# Patient Record
Sex: FEMALE | Race: WHITE | Employment: OTHER
[De-identification: names, ages, dates, MRNs, and addresses within clinical notes are randomized per-mention and may not be internally consistent; named-entity substitution may affect disease eponyms.]

---

## 2017-01-11 ENCOUNTER — PROCEDURE VISIT (OUTPATIENT)
Dept: PODIATRY | Facility: CLINIC | Age: 82
End: 2017-01-11

## 2017-01-11 VITALS
WEIGHT: 228 LBS | SYSTOLIC BLOOD PRESSURE: 134 MMHG | BODY MASS INDEX: 40.4 KG/M2 | HEART RATE: 70 BPM | DIASTOLIC BLOOD PRESSURE: 73 MMHG | HEIGHT: 63 IN

## 2017-01-11 DIAGNOSIS — M79.675 PAIN IN TOES OF BOTH FEET: ICD-10-CM

## 2017-01-11 DIAGNOSIS — B35.1 ONYCHOMYCOSIS: Primary | ICD-10-CM

## 2017-01-11 DIAGNOSIS — M79.674 PAIN IN TOES OF BOTH FEET: ICD-10-CM

## 2017-01-11 PROCEDURE — 11721 DEBRIDE NAIL 6 OR MORE: CPT | Performed by: PODIATRIST

## 2017-04-12 ENCOUNTER — PROCEDURE VISIT (OUTPATIENT)
Dept: PODIATRY | Age: 82
End: 2017-04-12
Payer: MEDICARE

## 2017-04-12 VITALS
HEIGHT: 64 IN | DIASTOLIC BLOOD PRESSURE: 77 MMHG | WEIGHT: 210 LBS | SYSTOLIC BLOOD PRESSURE: 160 MMHG | HEART RATE: 64 BPM | BODY MASS INDEX: 35.85 KG/M2

## 2017-04-12 DIAGNOSIS — M79.674 PAIN IN TOES OF BOTH FEET: ICD-10-CM

## 2017-04-12 DIAGNOSIS — M79.675 PAIN IN TOES OF BOTH FEET: ICD-10-CM

## 2017-04-12 DIAGNOSIS — B35.1 ONYCHOMYCOSIS: Primary | ICD-10-CM

## 2017-04-12 PROCEDURE — 11721 DEBRIDE NAIL 6 OR MORE: CPT | Performed by: PODIATRIST

## 2017-04-12 PROCEDURE — 1036F TOBACCO NON-USER: CPT | Performed by: PODIATRIST

## 2017-07-12 ENCOUNTER — PROCEDURE VISIT (OUTPATIENT)
Dept: PODIATRY | Age: 82
End: 2017-07-12
Payer: MEDICARE

## 2017-07-12 VITALS
BODY MASS INDEX: 35.85 KG/M2 | HEART RATE: 77 BPM | SYSTOLIC BLOOD PRESSURE: 135 MMHG | DIASTOLIC BLOOD PRESSURE: 61 MMHG | WEIGHT: 210 LBS | HEIGHT: 64 IN

## 2017-07-12 DIAGNOSIS — M79.674 PAIN IN TOES OF BOTH FEET: ICD-10-CM

## 2017-07-12 DIAGNOSIS — B35.1 ONYCHOMYCOSIS: Primary | ICD-10-CM

## 2017-07-12 DIAGNOSIS — M79.675 PAIN IN TOES OF BOTH FEET: ICD-10-CM

## 2017-07-12 PROCEDURE — 1036F TOBACCO NON-USER: CPT | Performed by: PODIATRIST

## 2017-07-12 PROCEDURE — 11721 DEBRIDE NAIL 6 OR MORE: CPT | Performed by: PODIATRIST

## 2017-11-29 ENCOUNTER — OFFICE VISIT (OUTPATIENT)
Dept: PODIATRY | Age: 82
End: 2017-11-29
Payer: MEDICARE

## 2017-11-29 VITALS
BODY MASS INDEX: 35.85 KG/M2 | DIASTOLIC BLOOD PRESSURE: 67 MMHG | WEIGHT: 210 LBS | SYSTOLIC BLOOD PRESSURE: 142 MMHG | HEIGHT: 64 IN | HEART RATE: 68 BPM

## 2017-11-29 DIAGNOSIS — B35.1 ONYCHOMYCOSIS: Primary | ICD-10-CM

## 2017-11-29 DIAGNOSIS — M79.674 PAIN IN TOES OF BOTH FEET: ICD-10-CM

## 2017-11-29 DIAGNOSIS — M79.675 PAIN IN TOES OF BOTH FEET: ICD-10-CM

## 2017-11-29 PROCEDURE — 11721 DEBRIDE NAIL 6 OR MORE: CPT | Performed by: PODIATRIST

## 2017-11-29 NOTE — PROGRESS NOTES
[facility]  Return Patient    Chief Complaint   Patient presents with    Nail Problem     nail trim/last saw August Sit 5/3/17       Subjective: This B Chelsy Ill comes to clinic for foot and nail care. Pt currently has complaint of thickened, painful, elongated nails that he/she cannot manage by themselves. Pt. Relates pain to nails with shoe gear. Pt's primary care physician is Darin Calvo. Past Medical History:   Diagnosis Date    Hyperlipidemia     Hypertension     Short-term memory loss     Swelling of both lower extremities 09/15/1993       Allergies   Allergen Reactions    Asa [Aspirin]     Feldene [Piroxicam]      Current Outpatient Prescriptions on File Prior to Visit   Medication Sig Dispense Refill    clopidogrel (PLAVIX) 75 MG tablet Take 75 mg by mouth daily      ferrous gluconate (FERGON) 324 (38 FE) MG tablet Take 324 mg by mouth daily (with breakfast)      levothyroxine (SYNTHROID) 175 MCG tablet Take 1 tablet by mouth every morning 30 tablet 3    levothyroxine (SYNTHROID) 175 MCG tablet Take 175 mcg by mouth Daily      losartan (COZAAR) 100 MG tablet Take 100 mg by mouth every morning      Calcium Carbonate-Vit D-Min (CALCIUM 1200) 9438-0787 MG-UNIT CHEW Take 1 each by mouth nightly       amLODIPine (NORVASC) 5 MG tablet Take 5 mg by mouth nightly      furosemide (LASIX) 20 MG tablet Take 20 mg by mouth every morning       Cholecalciferol (VITAMIN D-3) 1000 UNITS CAPS Take 1,000 mg by mouth nightly       lovastatin (MEVACOR) 40 MG tablet Take 40 mg by mouth Daily with supper       citalopram (CELEXA) 20 MG tablet Take 20 mg by mouth nightly       nystatin (MYCOSTATIN) 195272 UNIT/GM powder Apply topically 2 times daily Apply topically 4 times daily. No current facility-administered medications on file prior to visit. Review of Systems  Objective:  General: AAO x 3 in NAD.     Derm  Toenail Description  Sites of Onychomycosis Involvement (Check affected with patient and confirmed.    11/29/2017    Electronically signed by Julito Sánchez DPM on 11/29/2017 at 4:14 PM

## 2017-12-13 RX ORDER — LEVOTHYROXINE SODIUM 0.2 MG/1
200 TABLET ORAL DAILY
COMMUNITY
End: 2018-02-19

## 2017-12-13 RX ORDER — PANTOPRAZOLE SODIUM 40 MG/1
40 TABLET, DELAYED RELEASE ORAL DAILY
COMMUNITY

## 2017-12-13 RX ORDER — METOPROLOL SUCCINATE 50 MG/1
50 TABLET, EXTENDED RELEASE ORAL DAILY
COMMUNITY

## 2018-02-19 ENCOUNTER — OFFICE VISIT (OUTPATIENT)
Dept: FAMILY MEDICINE CLINIC | Age: 83
End: 2018-02-19
Payer: MEDICARE

## 2018-02-19 VITALS
HEART RATE: 60 BPM | DIASTOLIC BLOOD PRESSURE: 70 MMHG | SYSTOLIC BLOOD PRESSURE: 110 MMHG | TEMPERATURE: 97.8 F | BODY MASS INDEX: 36.7 KG/M2 | WEIGHT: 215 LBS | HEIGHT: 64 IN | RESPIRATION RATE: 14 BRPM

## 2018-02-19 DIAGNOSIS — I10 ESSENTIAL HYPERTENSION: Primary | ICD-10-CM

## 2018-02-19 DIAGNOSIS — E78.5 HYPERLIPIDEMIA, UNSPECIFIED HYPERLIPIDEMIA TYPE: ICD-10-CM

## 2018-02-19 DIAGNOSIS — K21.9 GASTROESOPHAGEAL REFLUX DISEASE, ESOPHAGITIS PRESENCE NOT SPECIFIED: ICD-10-CM

## 2018-02-19 DIAGNOSIS — F32.A DEPRESSION, UNSPECIFIED DEPRESSION TYPE: ICD-10-CM

## 2018-02-19 PROCEDURE — 1090F PRES/ABSN URINE INCON ASSESS: CPT | Performed by: FAMILY MEDICINE

## 2018-02-19 PROCEDURE — 1123F ACP DISCUSS/DSCN MKR DOCD: CPT | Performed by: FAMILY MEDICINE

## 2018-02-19 PROCEDURE — 1036F TOBACCO NON-USER: CPT | Performed by: FAMILY MEDICINE

## 2018-02-19 PROCEDURE — 4040F PNEUMOC VAC/ADMIN/RCVD: CPT | Performed by: FAMILY MEDICINE

## 2018-02-19 PROCEDURE — G8417 CALC BMI ABV UP PARAM F/U: HCPCS | Performed by: FAMILY MEDICINE

## 2018-02-19 PROCEDURE — G8427 DOCREV CUR MEDS BY ELIG CLIN: HCPCS | Performed by: FAMILY MEDICINE

## 2018-02-19 PROCEDURE — 99204 OFFICE O/P NEW MOD 45 MIN: CPT | Performed by: FAMILY MEDICINE

## 2018-02-19 PROCEDURE — G8484 FLU IMMUNIZE NO ADMIN: HCPCS | Performed by: FAMILY MEDICINE

## 2018-02-19 RX ORDER — LEVOTHYROXINE SODIUM 0.12 MG/1
175 TABLET ORAL DAILY
COMMUNITY
End: 2019-05-06

## 2018-02-19 ASSESSMENT — PATIENT HEALTH QUESTIONNAIRE - PHQ9
2. FEELING DOWN, DEPRESSED OR HOPELESS: 0
1. LITTLE INTEREST OR PLEASURE IN DOING THINGS: 0
SUM OF ALL RESPONSES TO PHQ QUESTIONS 1-9: 0
SUM OF ALL RESPONSES TO PHQ9 QUESTIONS 1 & 2: 0

## 2018-02-19 ASSESSMENT — ENCOUNTER SYMPTOMS
CHEST TIGHTNESS: 0
BLOOD IN STOOL: 0
SHORTNESS OF BREATH: 0
ABDOMINAL PAIN: 0

## 2018-02-19 NOTE — PROGRESS NOTES
Subjective:      Patient ID: Nabor Zavala is a 80 y.o. female. Visit Information    Have you changed or started any medications since your last visit including any over-the-counter medicines, vitamins, or herbal medicines? no   Are you having any side effects from any of your medications? -  no  Have you stopped taking any of your medications? Is so, why? -  no    Have you seen any other physician or provider since your last visit? No  Have you had any other diagnostic tests since your last visit? No  Have you been seen in the emergency room and/or had an admission to a hospital since we last saw you? No  Have you had your routine dental cleaning in the past 6 months? no    Have you activated your [employer] account? If not, what are your barriers? No: declined     Patient Care Team:  Osvaldo Aiken MD as PCP - General (Family Medicine)  Zoey Apple MD as Surgeon (Cardiology)  Ale Ngo DPM as Consulting Physician (Podiatry)  Jakob Mitchell (Endocrinology)    Medical History Review  Past Medical, Family, and Social History reviewed and does contribute to the patient presenting condition    Health Maintenance   Topic Date Due    DTaP/Tdap/Td vaccine (1 - Tdap) 09/30/1939    Zostavax vaccine  09/30/1980    Pneumococcal low/med risk (1 of 2 - PCV13) 09/30/1985    Flu vaccine (1) 09/01/2017    TSH testing  10/21/2017    Potassium monitoring  11/21/2017    Creatinine monitoring  11/21/2017     HPI  Patient is a 66-year-old obese white female who presents for her initial office visit. She was brought to the office by her nephew. Patient is here for hypertension, hyperlipidemia, depression, GERD. She states that she is taking and tolerating her medications. She states she has a history of hypothyroidism for which she sees her endocrinologist, Dr. Wing Zhou. She also has a history of atrial fibrillation for which she sees cardiologist, Dr. Patel Lipscomb.  She denies any chest pain, abdominal pain, shortness of breath, fever or chills. She states that she has a good appetite. Review of Systems   Constitutional: Negative for chills and fever. HENT: Negative for congestion. Respiratory: Negative for chest tightness and shortness of breath. Cardiovascular: Negative for chest pain. Gastrointestinal: Negative for abdominal pain and blood in stool. Genitourinary: Negative for dysuria and hematuria. Skin: Negative for rash. Neurological: Negative for dizziness. Psychiatric/Behavioral: Negative for confusion. Objective:   Physical Exam   Constitutional: She is oriented to person, place, and time. She appears well-developed and well-nourished. No distress. HENT:   Head: Normocephalic and atraumatic. Right Ear: Tympanic membrane, external ear and ear canal normal.   Left Ear: Tympanic membrane, external ear and ear canal normal.   Nose: Nose normal.   Mouth/Throat: Oropharynx is clear and moist.   Eyes: Conjunctivae are normal. Right eye exhibits no discharge. Left eye exhibits no discharge. No scleral icterus. Neck: Neck supple. Cardiovascular: Normal rate, normal heart sounds and intact distal pulses. An irregular rhythm present. Pulmonary/Chest: Effort normal and breath sounds normal. No respiratory distress. She has no wheezes. Abdominal: Soft. She exhibits no distension. There is no tenderness. Musculoskeletal: She exhibits no edema. Neurological: She is alert and oriented to person, place, and time. Skin: Skin is warm and dry. No rash noted. Psychiatric: She has a normal mood and affect. Her behavior is normal.   Nursing note and vitals reviewed. Assessment:      1. Essential hypertension  CBC Auto Differential    Comprehensive Metabolic Panel    Lipid Panel    Magnesium   2. Hyperlipidemia, unspecified hyperlipidemia type  CBC Auto Differential    Comprehensive Metabolic Panel    Lipid Panel   3. Depression, unspecified depression type     4.  Gastroesophageal reflux disease, esophagitis presence not specified             Plan:       Donovanabdulkadir Pallas received counseling on the following healthy behaviors: nutrition and medication adherence  Reviewed prior labs and health maintenance  Continue current medications, diet and exercise. Discussed use, benefit, and side effects of prescribed medications. Barriers to medication compliance addressed. Patient given educational materials - see patient instructions  Was a self-tracking handout given in paper form or via [employer]? No:     Requested Prescriptions      No prescriptions requested or ordered in this encounter       All patient questions answered. Patient voiced understanding. Quality Measures    Body mass index is 36.9 kg/m². Elevated. Weight control planned discussed Healthy diet and regular exercise. BP: 110/70. Blood pressure is normal. Treatment plan consists of No treatment change needed. Fall Risk 2/19/2018   2 or more falls in past year? no   Fall with injury in past year? no     The patient does not have a history of falls. I did not - not indicated , complete a risk assessment for falls. A plan of care for falls No Treatment plan indicated    No results found for: LDLCALC, LDLCHOLESTEROL, LDLDIRECT (goal LDL reduction with dx if diabetes is 50% LDL reduction)    PHQ Scores 2/19/2018   PHQ2 Score 0   PHQ9 Score 0     Interpretation of Total Score Depression Severity: 1-4 = Minimal depression, 5-9 = Mild depression, 10-14 = Moderate depression, 15-19 = Moderately severe depression, 20-27 = Severe depression    Orders Placed This Encounter   Procedures    CBC Auto Differential     Standing Status:   Future     Standing Expiration Date:   2/19/2019    Comprehensive Metabolic Panel     Standing Status:   Future     Standing Expiration Date:   2/19/2019    Lipid Panel     Standing Status:   Future     Standing Expiration Date:   2/19/2019     Order Specific Question:   Is Patient Fasting?/# of Hours     Answer:    Fast 8-10

## 2018-03-08 ENCOUNTER — OFFICE VISIT (OUTPATIENT)
Dept: PODIATRY | Age: 83
End: 2018-03-08
Payer: MEDICARE

## 2018-03-08 VITALS
DIASTOLIC BLOOD PRESSURE: 66 MMHG | HEIGHT: 64 IN | BODY MASS INDEX: 35.85 KG/M2 | SYSTOLIC BLOOD PRESSURE: 119 MMHG | HEART RATE: 95 BPM | WEIGHT: 210 LBS

## 2018-03-08 DIAGNOSIS — B35.1 ONYCHOMYCOSIS: Primary | ICD-10-CM

## 2018-03-08 DIAGNOSIS — M79.675 PAIN IN TOES OF BOTH FEET: ICD-10-CM

## 2018-03-08 DIAGNOSIS — M79.674 PAIN IN TOES OF BOTH FEET: ICD-10-CM

## 2018-03-08 LAB
ALBUMIN SERPL-MCNC: 3.7 G/DL
ALP BLD-CCNC: 57 U/L
ALT SERPL-CCNC: 6 U/L
ANION GAP SERPL CALCULATED.3IONS-SCNC: ABNORMAL MMOL/L
AST SERPL-CCNC: 13 U/L
BASOPHILS ABSOLUTE: 0 /ΜL
BASOPHILS RELATIVE PERCENT: 0.6 %
BILIRUB SERPL-MCNC: 0.5 MG/DL (ref 0.1–1.4)
BUN BLDV-MCNC: 33 MG/DL
CALCIUM SERPL-MCNC: 9.3 MG/DL
CHLORIDE BLD-SCNC: 103 MMOL/L
CHOLESTEROL, TOTAL: 161 MG/DL
CHOLESTEROL/HDL RATIO: 3.4
CO2: 27 MMOL/L
CREAT SERPL-MCNC: 1.68 MG/DL
EOSINOPHILS ABSOLUTE: 0.1 /ΜL
EOSINOPHILS RELATIVE PERCENT: 1.3 %
GFR CALCULATED: 28
GLUCOSE BLD-MCNC: 106 MG/DL
HCT VFR BLD CALC: 34.9 % (ref 36–46)
HDLC SERPL-MCNC: 48 MG/DL (ref 35–70)
HEMOGLOBIN: 11.8 G/DL (ref 12–16)
LDL CHOLESTEROL CALCULATED: 96 MG/DL (ref 0–160)
LYMPHOCYTES ABSOLUTE: 1.2 /ΜL
LYMPHOCYTES RELATIVE PERCENT: 16.6 %
MAGNESIUM: 1.8 MG/DL
MCH RBC QN AUTO: 30.5 PG
MCHC RBC AUTO-ENTMCNC: 33.9 G/DL
MCV RBC AUTO: 90 FL
MONOCYTES ABSOLUTE: 0.6 /ΜL
MONOCYTES RELATIVE PERCENT: 8 %
NEUTROPHILS ABSOLUTE: 5.1 /ΜL
NEUTROPHILS RELATIVE PERCENT: 73.5 %
PDW BLD-RTO: 13.3 %
PLATELET # BLD: 227 K/ΜL
PMV BLD AUTO: 7.6 FL
POTASSIUM SERPL-SCNC: 3.7 MMOL/L
RBC # BLD: 3.89 10^6/ΜL
SODIUM BLD-SCNC: 138 MMOL/L
TOTAL PROTEIN: 6.6
TRIGL SERPL-MCNC: 83 MG/DL
VLDLC SERPL CALC-MCNC: 17 MG/DL
WBC # BLD: 7 10^3/ML

## 2018-03-08 PROCEDURE — 11721 DEBRIDE NAIL 6 OR MORE: CPT | Performed by: PODIATRIST

## 2018-03-08 NOTE — PROGRESS NOTES
Systems  Objective:  General: AAO x 3 in NAD. Derm  Toenail Description  Sites of Onychomycosis Involvement (Check affected area)  [x] [x] [x] [x] [x] [x] [x] [x] [x] [x]  5 4 3 2 1 1 2 3 4 5                          Right                                        Left    Thickness  [x] [x] [x] [x] [x] [x] [x] [x] [x] [x]  5 4 3 2 1 1 2 3 4 5                         Right                                        Left    Dystrophic Changes   [x] [x] [x] [x] [x] [x] [x] [x] [x] [x]  5 4 3 2 1 1 2 3 4 5                         Right                                        Left    Color  [x] [x] [x] [x] [x] [x] [x] [x] [x] [x]  5 4 3 2 1 1 2 3 4 5                          Right                                        Left    Incurvation/Ingrowin   [] [] [] [] [] [] [] [] [] []  5 4 3 2 1 1 2 3 4 5                         Right                                        Left    Inflammation/Pain   [x] [x] [x] [x] [x] [x] [x] [x] [x] [x]  5 4 3 2 1 1 2 3 4 5                         Right                                        Left       Nails are painful 1-10 with direct palpation. Vascular:  DP/PT pulses palpable 2/4, Bilateral.    CFT <3 seconds to digits 1-5, Bilateral .   Hair growth absent to level of digits, Bilateral.    Neurological:  Sensation present to light touch to level of digits, Bilateral.    Assessment:  80 y.o. female with:   1. Onychomycosis    2. Pain in toes of both feet       Orders Placed This Encounter   Procedures    [place] DEBRIDEMENT OF NAILS, 6 OR MORE         Plan:   Pt was evaluated and examined. Patient was given personalized discharge instructions. Nails 1-10 were debrided in length and thickness sharply with a nail nipper and  without incident. Pt will follow up in 3 months or sooner if any problems arise. Diagnosis was discussed with the pt and all of their questions were answered in detail. Proper foot hygiene and care was discussed with the pt.  Patient to check feet daily and contact

## 2018-03-09 DIAGNOSIS — I10 ESSENTIAL HYPERTENSION: ICD-10-CM

## 2018-03-09 DIAGNOSIS — E78.5 HYPERLIPIDEMIA, UNSPECIFIED HYPERLIPIDEMIA TYPE: ICD-10-CM

## 2018-03-15 DIAGNOSIS — R79.9 ELEVATED BUN: Primary | ICD-10-CM

## 2018-03-15 DIAGNOSIS — R79.89 ELEVATED SERUM CREATININE: ICD-10-CM

## 2018-04-13 PROBLEM — N18.4 CKD (CHRONIC KIDNEY DISEASE) STAGE 4, GFR 15-29 ML/MIN (HCC): Status: ACTIVE | Noted: 2018-04-13

## 2018-06-19 ENCOUNTER — OFFICE VISIT (OUTPATIENT)
Dept: PODIATRY | Age: 83
End: 2018-06-19
Payer: MEDICARE

## 2018-06-19 VITALS
DIASTOLIC BLOOD PRESSURE: 73 MMHG | HEART RATE: 61 BPM | BODY MASS INDEX: 35.17 KG/M2 | SYSTOLIC BLOOD PRESSURE: 143 MMHG | HEIGHT: 64 IN | WEIGHT: 206 LBS

## 2018-06-19 DIAGNOSIS — M79.674 PAIN IN TOES OF BOTH FEET: ICD-10-CM

## 2018-06-19 DIAGNOSIS — B35.1 ONYCHOMYCOSIS: Primary | ICD-10-CM

## 2018-06-19 DIAGNOSIS — M79.675 PAIN IN TOES OF BOTH FEET: ICD-10-CM

## 2018-06-19 PROCEDURE — 11721 DEBRIDE NAIL 6 OR MORE: CPT | Performed by: PODIATRIST

## 2018-06-19 PROCEDURE — 99999 PR OFFICE/OUTPT VISIT,PROCEDURE ONLY: CPT | Performed by: PODIATRIST

## 2018-07-24 ENCOUNTER — OFFICE VISIT (OUTPATIENT)
Dept: FAMILY MEDICINE CLINIC | Age: 83
End: 2018-07-24
Payer: MEDICARE

## 2018-07-24 VITALS — DIASTOLIC BLOOD PRESSURE: 60 MMHG | SYSTOLIC BLOOD PRESSURE: 100 MMHG | RESPIRATION RATE: 14 BRPM | HEART RATE: 60 BPM

## 2018-07-24 DIAGNOSIS — I10 ESSENTIAL HYPERTENSION: Primary | ICD-10-CM

## 2018-07-24 DIAGNOSIS — I44.2 HEART BLOCK AV THIRD DEGREE (HCC): ICD-10-CM

## 2018-07-24 DIAGNOSIS — F32.A DEPRESSION, UNSPECIFIED DEPRESSION TYPE: ICD-10-CM

## 2018-07-24 DIAGNOSIS — I48.91 ATRIAL FIBRILLATION, UNSPECIFIED TYPE (HCC): ICD-10-CM

## 2018-07-24 DIAGNOSIS — E78.5 HYPERLIPIDEMIA, UNSPECIFIED HYPERLIPIDEMIA TYPE: ICD-10-CM

## 2018-07-24 PROCEDURE — 1090F PRES/ABSN URINE INCON ASSESS: CPT | Performed by: FAMILY MEDICINE

## 2018-07-24 PROCEDURE — 1123F ACP DISCUSS/DSCN MKR DOCD: CPT | Performed by: FAMILY MEDICINE

## 2018-07-24 PROCEDURE — 4040F PNEUMOC VAC/ADMIN/RCVD: CPT | Performed by: FAMILY MEDICINE

## 2018-07-24 PROCEDURE — G8417 CALC BMI ABV UP PARAM F/U: HCPCS | Performed by: FAMILY MEDICINE

## 2018-07-24 PROCEDURE — 99214 OFFICE O/P EST MOD 30 MIN: CPT | Performed by: FAMILY MEDICINE

## 2018-07-24 PROCEDURE — 1036F TOBACCO NON-USER: CPT | Performed by: FAMILY MEDICINE

## 2018-07-24 PROCEDURE — 1101F PT FALLS ASSESS-DOCD LE1/YR: CPT | Performed by: FAMILY MEDICINE

## 2018-07-24 PROCEDURE — G8427 DOCREV CUR MEDS BY ELIG CLIN: HCPCS | Performed by: FAMILY MEDICINE

## 2018-07-24 RX ORDER — AMOXICILLIN 500 MG/1
CAPSULE ORAL
Qty: 4 CAPSULE | Refills: 0 | Status: SHIPPED | OUTPATIENT
Start: 2018-07-24 | End: 2019-08-19

## 2018-07-24 ASSESSMENT — ENCOUNTER SYMPTOMS
SHORTNESS OF BREATH: 0
ABDOMINAL PAIN: 0
BLOOD IN STOOL: 0
CHEST TIGHTNESS: 0

## 2018-07-24 NOTE — PROGRESS NOTES
indicated , complete a risk assessment for falls. A plan of care for falls No Treatment plan indicated    Lab Results   Component Value Date    LDLCALC 96 03/08/2018    (goal LDL reduction with dx if diabetes is 50% LDL reduction)    PHQ Scores 2/19/2018   PHQ2 Score 0   PHQ9 Score 0     Interpretation of Total Score Depression Severity: 1-4 = Minimal depression, 5-9 = Mild depression, 10-14 = Moderate depression, 15-19 = Moderately severe depression, 20-27 = Severe depression    Orders Placed This Encounter   Procedures    Lipid Panel     Standing Status:   Future     Standing Expiration Date:   7/24/2019     Order Specific Question:   Is Patient Fasting?/# of Hours     Answer:    Fast 8-10 hours     Orders Placed This Encounter   Medications    amoxicillin (AMOXIL) 500 MG capsule     Sig: Take all 4 capsules about 1 hour prior to procedure     Dispense:  4 capsule     Refill:  0     Continue routine medications  Follow-up in 4 months

## 2018-08-06 LAB
CHOLESTEROL, TOTAL: 156 MG/DL
CHOLESTEROL/HDL RATIO: 4.1
HDLC SERPL-MCNC: 38 MG/DL (ref 35–70)
LDL CHOLESTEROL CALCULATED: 101 MG/DL (ref 0–160)
TRIGL SERPL-MCNC: 87 MG/DL
VLDLC SERPL CALC-MCNC: 17 MG/DL

## 2018-08-07 DIAGNOSIS — I10 ESSENTIAL HYPERTENSION: ICD-10-CM

## 2018-08-07 DIAGNOSIS — E78.5 HYPERLIPIDEMIA, UNSPECIFIED HYPERLIPIDEMIA TYPE: ICD-10-CM

## 2018-08-13 PROBLEM — N18.4 CKD (CHRONIC KIDNEY DISEASE) STAGE 4, GFR 15-29 ML/MIN (HCC): Status: RESOLVED | Noted: 2018-04-13 | Resolved: 2018-08-13

## 2018-10-24 ENCOUNTER — OFFICE VISIT (OUTPATIENT)
Dept: PODIATRY | Age: 83
End: 2018-10-24
Payer: MEDICARE

## 2018-10-24 VITALS — BODY MASS INDEX: 32.99 KG/M2 | WEIGHT: 198 LBS | HEIGHT: 65 IN

## 2018-10-24 DIAGNOSIS — M79.674 PAIN IN TOES OF BOTH FEET: ICD-10-CM

## 2018-10-24 DIAGNOSIS — M79.675 PAIN IN TOES OF BOTH FEET: ICD-10-CM

## 2018-10-24 DIAGNOSIS — B35.1 ONYCHOMYCOSIS: Primary | ICD-10-CM

## 2018-10-24 PROCEDURE — 11721 DEBRIDE NAIL 6 OR MORE: CPT | Performed by: PODIATRIST

## 2018-10-24 PROCEDURE — 99999 PR OFFICE/OUTPT VISIT,PROCEDURE ONLY: CPT | Performed by: PODIATRIST

## 2018-10-24 NOTE — PROGRESS NOTES
mouth Daily with supper       citalopram (CELEXA) 20 MG tablet Take 20 mg by mouth nightly        No current facility-administered medications on file prior to visit. Review of Systems  Objective:  General: AAO x 3 in NAD. Derm  Toenail Description  Sites of Onychomycosis Involvement (Check affected area)  [x] [x] [x] [x] [x] [x] [x] [x] [x] [x]  5 4 3 2 1 1 2 3 4 5                          Right                                        Left    Thickness  [x] [x] [x] [x] [x] [x] [x] [x] [x] [x]  5 4 3 2 1 1 2 3 4 5                         Right                                        Left    Dystrophic Changes   [x] [x] [x] [x] [x] [x] [x] [x] [x] [x]  5 4 3 2 1 1 2 3 4 5                         Right                                        Left    Color  [x] [x] [x] [x] [x] [x] [x] [x] [x] [x]  5 4 3 2 1 1 2 3 4 5                          Right                                        Left    Incurvation/Ingrowin   [] [] [] [] [] [] [] [] [] []  5 4 3 2 1 1 2 3 4 5                         Right                                        Left    Inflammation/Pain   [x] [x] [x] [x] [x] [x] [x] [x] [x] [x]  5 4 3 2 1 1 2 3 4 5                         Right                                        Left       Nails are painful 1-10 with direct palpation. Vascular:  DP/PT pulses palpable 2/4, Bilateral.    CFT <3 seconds to digits 1-5, Bilateral .   Hair growth absent to level of digits, Bilateral.    Neurological:  Sensation present to light touch to level of digits, Bilateral.    Assessment:  80 y.o. female with:   1. Onychomycosis    2. Pain in toes of both feet       Orders Placed This Encounter   Procedures    [place] DEBRIDEMENT OF NAILS, 6 OR MORE         Plan:   Pt was evaluated and examined. Patient was given personalized discharge instructions. Nails 1-10 were debrided in length and thickness sharply with a nail nipper and  without incident. Pt will follow up in 3 months or sooner if any problems arise. Diagnosis was discussed with the pt and all of their questions were answered in detail. Proper foot hygiene and care was discussed with the pt. Patient to check feet daily and contact the office with any questions/problems/concerns. Other comorbidity noted and will be managed by PCP. Pain waiver discussed with patient and confirmed.    10/24/2018    Electronically signed by Jaspreet Shelton DPM on 10/24/2018 at 4:32 PM

## 2018-11-09 ENCOUNTER — OFFICE VISIT (OUTPATIENT)
Dept: FAMILY MEDICINE CLINIC | Age: 83
End: 2018-11-09
Payer: MEDICARE

## 2018-11-09 VITALS
HEART RATE: 90 BPM | BODY MASS INDEX: 33.45 KG/M2 | WEIGHT: 200.8 LBS | DIASTOLIC BLOOD PRESSURE: 60 MMHG | HEIGHT: 65 IN | RESPIRATION RATE: 16 BRPM | SYSTOLIC BLOOD PRESSURE: 120 MMHG

## 2018-11-09 DIAGNOSIS — K52.9 CHRONIC DIARRHEA: ICD-10-CM

## 2018-11-09 DIAGNOSIS — E78.5 HYPERLIPIDEMIA, UNSPECIFIED HYPERLIPIDEMIA TYPE: ICD-10-CM

## 2018-11-09 DIAGNOSIS — T14.8XXA ABRASION OF SKIN: ICD-10-CM

## 2018-11-09 DIAGNOSIS — I10 ESSENTIAL HYPERTENSION: Primary | ICD-10-CM

## 2018-11-09 DIAGNOSIS — D64.9 ANEMIA, UNSPECIFIED TYPE: ICD-10-CM

## 2018-11-09 PROCEDURE — G0008 ADMIN INFLUENZA VIRUS VAC: HCPCS | Performed by: FAMILY MEDICINE

## 2018-11-09 PROCEDURE — 90662 IIV NO PRSV INCREASED AG IM: CPT | Performed by: FAMILY MEDICINE

## 2018-11-09 PROCEDURE — 4040F PNEUMOC VAC/ADMIN/RCVD: CPT | Performed by: FAMILY MEDICINE

## 2018-11-09 PROCEDURE — 1101F PT FALLS ASSESS-DOCD LE1/YR: CPT | Performed by: FAMILY MEDICINE

## 2018-11-09 PROCEDURE — G8417 CALC BMI ABV UP PARAM F/U: HCPCS | Performed by: FAMILY MEDICINE

## 2018-11-09 PROCEDURE — 1090F PRES/ABSN URINE INCON ASSESS: CPT | Performed by: FAMILY MEDICINE

## 2018-11-09 PROCEDURE — 1036F TOBACCO NON-USER: CPT | Performed by: FAMILY MEDICINE

## 2018-11-09 PROCEDURE — G8427 DOCREV CUR MEDS BY ELIG CLIN: HCPCS | Performed by: FAMILY MEDICINE

## 2018-11-09 PROCEDURE — 1123F ACP DISCUSS/DSCN MKR DOCD: CPT | Performed by: FAMILY MEDICINE

## 2018-11-09 PROCEDURE — 99214 OFFICE O/P EST MOD 30 MIN: CPT | Performed by: FAMILY MEDICINE

## 2018-11-09 PROCEDURE — G8482 FLU IMMUNIZE ORDER/ADMIN: HCPCS | Performed by: FAMILY MEDICINE

## 2018-11-09 RX ORDER — MUPIROCIN CALCIUM 20 MG/G
CREAM TOPICAL
Qty: 15 G | Refills: 0 | Status: SHIPPED | OUTPATIENT
Start: 2018-11-09 | End: 2019-05-06 | Stop reason: SDUPTHER

## 2018-11-09 ASSESSMENT — ENCOUNTER SYMPTOMS
SHORTNESS OF BREATH: 0
DIARRHEA: 1
BLOOD IN STOOL: 0
ABDOMINAL PAIN: 0
CHEST TIGHTNESS: 0

## 2018-11-09 NOTE — PROGRESS NOTES
a 80-year-old obese white female brought to the office by her nephew for hypertension and hyperlipidemia. Her nephew states that patient has had a problem with chronic diarrhea for over a year. He also states that she sometimes picks at and scratches the skin on her upper back. He states that she is taking and tolerating her routine medication. She denies any chest pain, shortness of breath, abdominal pain, fever or chills. She also states she has a good appetite. Review of Systems   Constitutional: Negative for chills and fever. HENT: Negative for congestion. Respiratory: Negative for chest tightness and shortness of breath. Cardiovascular: Negative for chest pain. Gastrointestinal: Positive for diarrhea. Negative for abdominal pain and blood in stool. Genitourinary: Negative for dysuria and hematuria. Skin: Negative for rash. Neurological: Negative for dizziness. Objective:   Physical Exam   Constitutional: She is oriented to person, place, and time. She appears well-developed and well-nourished. No distress. HENT:   Head: Normocephalic and atraumatic. Right Ear: Tympanic membrane, external ear and ear canal normal.   Left Ear: Tympanic membrane, external ear and ear canal normal.   Nose: Nose normal.   Mouth/Throat: Oropharynx is clear and moist.   Eyes: Conjunctivae are normal. Right eye exhibits no discharge. Left eye exhibits no discharge. No scleral icterus. Neck: Neck supple. Cardiovascular: Normal rate, regular rhythm and normal heart sounds. Pulmonary/Chest: Effort normal and breath sounds normal. No respiratory distress. She has no wheezes. Abdominal: Soft. She exhibits no distension. There is no tenderness. Musculoskeletal: She exhibits no edema. Neurological: She is alert and oriented to person, place, and time. Skin: Skin is warm and dry. Abrasion (upper back) noted. No rash noted. Psychiatric: She has a normal mood and affect.  Her behavior is normal. HIGH DOSE, 65 YRS +, IM, PF, PREFILL SYR, 0.5ML (FLUZONE HD)   Genaro Gonsalez MD, [facility] [place]     Referral Priority:   Routine     Referral Type:   Consult for Advice and Opinion     Referral Reason:   Specialty Services Required     Referred to Provider:   Gianni Van MD     Requested Specialty:   Gastroenterology     Number of Visits Requested:   1         Continue routine medications  Patient to get lab work done that was ordered by her nephrologist, Dr. Lisbeth Fuchs.  4-5 months

## 2019-01-01 ENCOUNTER — OFFICE VISIT (OUTPATIENT)
Dept: FAMILY MEDICINE CLINIC | Age: 84
End: 2019-01-01
Payer: MEDICARE

## 2019-01-01 VITALS
HEART RATE: 72 BPM | WEIGHT: 200.4 LBS | TEMPERATURE: 97.3 F | RESPIRATION RATE: 16 BRPM | SYSTOLIC BLOOD PRESSURE: 126 MMHG | BODY MASS INDEX: 31.39 KG/M2 | DIASTOLIC BLOOD PRESSURE: 56 MMHG

## 2019-01-01 DIAGNOSIS — I10 ESSENTIAL HYPERTENSION: Primary | ICD-10-CM

## 2019-01-01 DIAGNOSIS — K21.9 GASTROESOPHAGEAL REFLUX DISEASE, ESOPHAGITIS PRESENCE NOT SPECIFIED: ICD-10-CM

## 2019-01-01 DIAGNOSIS — E78.5 HYPERLIPIDEMIA, UNSPECIFIED HYPERLIPIDEMIA TYPE: ICD-10-CM

## 2019-01-01 DIAGNOSIS — D64.9 ANEMIA, UNSPECIFIED TYPE: ICD-10-CM

## 2019-01-01 DIAGNOSIS — N18.30 STAGE 3 CHRONIC KIDNEY DISEASE (HCC): ICD-10-CM

## 2019-01-01 LAB
BUN BLDV-MCNC: 30 MG/DL
CALCIUM SERPL-MCNC: 9.4 MG/DL
CHLORIDE BLD-SCNC: 102 MMOL/L
CO2: 24 MMOL/L
CREAT SERPL-MCNC: 1.45 MG/DL
GFR CALCULATED: 40
GLUCOSE BLD-MCNC: 115 MG/DL
HCT VFR BLD CALC: 27.6 % (ref 36–46)
HEMOGLOBIN: 9.1 G/DL (ref 12–16)
POTASSIUM SERPL-SCNC: 4.7 MMOL/L
SODIUM BLD-SCNC: 136 MMOL/L

## 2019-01-01 PROCEDURE — 4040F PNEUMOC VAC/ADMIN/RCVD: CPT | Performed by: FAMILY MEDICINE

## 2019-01-01 PROCEDURE — 99214 OFFICE O/P EST MOD 30 MIN: CPT | Performed by: FAMILY MEDICINE

## 2019-01-01 PROCEDURE — G8484 FLU IMMUNIZE NO ADMIN: HCPCS | Performed by: FAMILY MEDICINE

## 2019-01-01 PROCEDURE — G8427 DOCREV CUR MEDS BY ELIG CLIN: HCPCS | Performed by: FAMILY MEDICINE

## 2019-01-01 PROCEDURE — 1036F TOBACCO NON-USER: CPT | Performed by: FAMILY MEDICINE

## 2019-01-01 PROCEDURE — 1123F ACP DISCUSS/DSCN MKR DOCD: CPT | Performed by: FAMILY MEDICINE

## 2019-01-01 PROCEDURE — 1090F PRES/ABSN URINE INCON ASSESS: CPT | Performed by: FAMILY MEDICINE

## 2019-01-01 PROCEDURE — G8417 CALC BMI ABV UP PARAM F/U: HCPCS | Performed by: FAMILY MEDICINE

## 2019-01-01 RX ORDER — FUROSEMIDE 40 MG/1
40 TABLET ORAL 2 TIMES DAILY
COMMUNITY
End: 2020-01-01

## 2019-01-01 ASSESSMENT — ENCOUNTER SYMPTOMS
SHORTNESS OF BREATH: 0
BLOOD IN STOOL: 0
CHEST TIGHTNESS: 0
ABDOMINAL PAIN: 0

## 2019-01-14 PROBLEM — D64.9 ANEMIA: Status: ACTIVE | Noted: 2019-01-14

## 2019-01-16 ENCOUNTER — OFFICE VISIT (OUTPATIENT)
Dept: GASTROENTEROLOGY | Age: 84
End: 2019-01-16
Payer: MEDICARE

## 2019-01-16 ENCOUNTER — HOSPITAL ENCOUNTER (OUTPATIENT)
Age: 84
Discharge: HOME | End: 2019-01-16
Payer: MEDICARE

## 2019-01-16 VITALS
SYSTOLIC BLOOD PRESSURE: 144 MMHG | BODY MASS INDEX: 34.38 KG/M2 | DIASTOLIC BLOOD PRESSURE: 69 MMHG | WEIGHT: 206.6 LBS | HEART RATE: 56 BPM

## 2019-01-16 DIAGNOSIS — D64.9 ANEMIA, UNSPECIFIED TYPE: ICD-10-CM

## 2019-01-16 DIAGNOSIS — K52.9 CHRONIC DIARRHEA: ICD-10-CM

## 2019-01-16 DIAGNOSIS — D64.9 ANEMIA, UNSPECIFIED TYPE: Primary | ICD-10-CM

## 2019-01-16 LAB
FOLATE: 7.8 NG/ML
IRON SATURATION: 22 % (ref 20–55)
IRON: 55 UG/DL (ref 37–145)
TOTAL IRON BINDING CAPACITY: 245 UG/DL (ref 250–450)
UNSATURATED IRON BINDING CAPACITY: 190 UG/DL (ref 112–347)
VITAMIN B-12: 400 PG/ML (ref 232–1245)

## 2019-01-16 PROCEDURE — 82746 ASSAY OF FOLIC ACID SERUM: CPT

## 2019-01-16 PROCEDURE — 36415 COLL VENOUS BLD VENIPUNCTURE: CPT

## 2019-01-16 PROCEDURE — 1101F PT FALLS ASSESS-DOCD LE1/YR: CPT | Performed by: INTERNAL MEDICINE

## 2019-01-16 PROCEDURE — 83540 ASSAY OF IRON: CPT

## 2019-01-16 PROCEDURE — 82607 VITAMIN B-12: CPT

## 2019-01-16 PROCEDURE — G8482 FLU IMMUNIZE ORDER/ADMIN: HCPCS | Performed by: INTERNAL MEDICINE

## 2019-01-16 PROCEDURE — 99204 OFFICE O/P NEW MOD 45 MIN: CPT | Performed by: INTERNAL MEDICINE

## 2019-01-16 PROCEDURE — G8427 DOCREV CUR MEDS BY ELIG CLIN: HCPCS | Performed by: INTERNAL MEDICINE

## 2019-01-16 PROCEDURE — G8417 CALC BMI ABV UP PARAM F/U: HCPCS | Performed by: INTERNAL MEDICINE

## 2019-01-16 PROCEDURE — 1090F PRES/ABSN URINE INCON ASSESS: CPT | Performed by: INTERNAL MEDICINE

## 2019-01-16 PROCEDURE — 83550 IRON BINDING TEST: CPT

## 2019-01-16 ASSESSMENT — ENCOUNTER SYMPTOMS
ABDOMINAL PAIN: 0
VOICE CHANGE: 0
ANAL BLEEDING: 0
BACK PAIN: 0
VOMITING: 0
BLOOD IN STOOL: 0
SINUS PRESSURE: 0
DIARRHEA: 1
NAUSEA: 0
SHORTNESS OF BREATH: 0
COUGH: 0
ABDOMINAL DISTENTION: 0
CHEST TIGHTNESS: 0
RECTAL PAIN: 0
SINUS PAIN: 0
SORE THROAT: 0
CONSTIPATION: 0

## 2019-03-11 ENCOUNTER — OFFICE VISIT (OUTPATIENT)
Dept: PODIATRY | Age: 84
End: 2019-03-11
Payer: MEDICARE

## 2019-03-11 VITALS — HEIGHT: 64 IN | BODY MASS INDEX: 34.15 KG/M2 | WEIGHT: 200 LBS

## 2019-03-11 DIAGNOSIS — M79.675 PAIN IN TOES OF BOTH FEET: ICD-10-CM

## 2019-03-11 DIAGNOSIS — B35.1 ONYCHOMYCOSIS: Primary | ICD-10-CM

## 2019-03-11 DIAGNOSIS — M79.674 PAIN IN TOES OF BOTH FEET: ICD-10-CM

## 2019-03-11 PROCEDURE — 11721 DEBRIDE NAIL 6 OR MORE: CPT | Performed by: PODIATRIST

## 2019-03-11 PROCEDURE — 99999 PR OFFICE/OUTPT VISIT,PROCEDURE ONLY: CPT | Performed by: PODIATRIST

## 2019-05-06 ENCOUNTER — TELEPHONE (OUTPATIENT)
Dept: FAMILY MEDICINE CLINIC | Age: 84
End: 2019-05-06

## 2019-05-06 ENCOUNTER — OFFICE VISIT (OUTPATIENT)
Dept: FAMILY MEDICINE CLINIC | Age: 84
End: 2019-05-06
Payer: MEDICARE

## 2019-05-06 VITALS
HEART RATE: 60 BPM | TEMPERATURE: 97.9 F | BODY MASS INDEX: 34.33 KG/M2 | DIASTOLIC BLOOD PRESSURE: 70 MMHG | WEIGHT: 200 LBS | SYSTOLIC BLOOD PRESSURE: 110 MMHG | RESPIRATION RATE: 13 BRPM

## 2019-05-06 DIAGNOSIS — T14.8XXA ABRASION OF SKIN: ICD-10-CM

## 2019-05-06 DIAGNOSIS — K21.9 GASTROESOPHAGEAL REFLUX DISEASE, ESOPHAGITIS PRESENCE NOT SPECIFIED: ICD-10-CM

## 2019-05-06 DIAGNOSIS — I44.2 HEART BLOCK AV THIRD DEGREE (HCC): ICD-10-CM

## 2019-05-06 DIAGNOSIS — E78.5 HYPERLIPIDEMIA, UNSPECIFIED HYPERLIPIDEMIA TYPE: ICD-10-CM

## 2019-05-06 DIAGNOSIS — N18.30 CHRONIC KIDNEY DISEASE, STAGE III (MODERATE) (HCC): ICD-10-CM

## 2019-05-06 DIAGNOSIS — I10 ESSENTIAL HYPERTENSION: Primary | ICD-10-CM

## 2019-05-06 DIAGNOSIS — F32.A DEPRESSION, UNSPECIFIED DEPRESSION TYPE: ICD-10-CM

## 2019-05-06 DIAGNOSIS — I48.91 ATRIAL FIBRILLATION, UNSPECIFIED TYPE (HCC): ICD-10-CM

## 2019-05-06 PROCEDURE — 4040F PNEUMOC VAC/ADMIN/RCVD: CPT | Performed by: FAMILY MEDICINE

## 2019-05-06 PROCEDURE — G8427 DOCREV CUR MEDS BY ELIG CLIN: HCPCS | Performed by: FAMILY MEDICINE

## 2019-05-06 PROCEDURE — 99214 OFFICE O/P EST MOD 30 MIN: CPT | Performed by: FAMILY MEDICINE

## 2019-05-06 PROCEDURE — G8417 CALC BMI ABV UP PARAM F/U: HCPCS | Performed by: FAMILY MEDICINE

## 2019-05-06 PROCEDURE — 1036F TOBACCO NON-USER: CPT | Performed by: FAMILY MEDICINE

## 2019-05-06 PROCEDURE — 1123F ACP DISCUSS/DSCN MKR DOCD: CPT | Performed by: FAMILY MEDICINE

## 2019-05-06 PROCEDURE — 1090F PRES/ABSN URINE INCON ASSESS: CPT | Performed by: FAMILY MEDICINE

## 2019-05-06 RX ORDER — MUPIROCIN CALCIUM 20 MG/G
CREAM TOPICAL
Qty: 15 G | Refills: 0 | Status: SHIPPED | OUTPATIENT
Start: 2019-05-06

## 2019-05-06 RX ORDER — LEVOTHYROXINE SODIUM 0.2 MG/1
200 TABLET ORAL DAILY
COMMUNITY

## 2019-05-06 ASSESSMENT — PATIENT HEALTH QUESTIONNAIRE - PHQ9
2. FEELING DOWN, DEPRESSED OR HOPELESS: 0
SUM OF ALL RESPONSES TO PHQ QUESTIONS 1-9: 0
1. LITTLE INTEREST OR PLEASURE IN DOING THINGS: 0
SUM OF ALL RESPONSES TO PHQ QUESTIONS 1-9: 0
SUM OF ALL RESPONSES TO PHQ9 QUESTIONS 1 & 2: 0

## 2019-05-06 ASSESSMENT — ENCOUNTER SYMPTOMS
ABDOMINAL PAIN: 0
CHEST TIGHTNESS: 0
SHORTNESS OF BREATH: 0
BLOOD IN STOOL: 0

## 2019-05-06 NOTE — PROGRESS NOTES
Subjective:      Patient ID: Jewels Billingsley is a 80 y.o. female. Chronic Disease Visit Information    BP Readings from Last 3 Encounters:   01/16/19 (!) 144/69   11/09/18 120/60   08/13/18 (!) 141/73          LDL Calculated (mg/dL)   Date Value   08/06/2018 101     HDL (mg/dL)   Date Value   08/06/2018 38     BUN (mg/dL)   Date Value   11/23/2018 18     CREATININE (no units)   Date Value   11/23/2018 1.36     Glucose (mg/dL)   Date Value   11/23/2018 77            Have you changed or started any medications since your last visit including any over-the-counter medicines, vitamins, or herbal medicines? no   Are you having any side effects from any of your medications? -  no  Have you stopped taking any of your medications? Is so, why? -  no    Have you seen any other physician or provider since your last visit? Yes - Records Obtained  Have you had any other diagnostic tests since your last visit? Yes - Records Obtained  Have you been seen in the emergency room and/or had an admission to a hospital since we last saw you? No  Have you had your annual diabetic retinal (eye) exam? No  Have you had your routine dental cleaning in the past 6 months? no    Have you activated your [employer] account? If not, what are your barriers?  No: declined      Patient Care Team:  Laverne Lemon MD as PCP - General (Family Medicine)  Laverne Lemon MD as PCP - S Attributed Provider  Hung Gonzalez MD as Surgeon (Cardiology)  Alma Wu DPM as Consulting Physician (Podiatry)  Jae Subramanian (Endocrinology)  Luis M Rowland MD as Consulting Physician (Gastroenterology)         Medical History Review  Past Medical, Family, and Social History reviewed and does contribute to the patient presenting condition    Health Maintenance   Topic Date Due    DTaP/Tdap/Td vaccine (1 - Tdap) 09/30/1939    Shingles Vaccine (1 of 2) 09/30/1970    Pneumococcal 65+ years Vaccine (1 of 2 - PCV13) 09/30/1985    TSH testing  10/21/2017    person, place, and time. Skin: Skin is warm and dry. Abrasion (noninfected abrasion of right shoulder) noted. No rash noted. Psychiatric: She has a normal mood and affect. Her behavior is normal.   Nursing note and vitals reviewed. Assessment:        1. Essential hypertension    - ALT; Future  - AST; Future  - Lipid Panel; Future  continue current management  2. Hyperlipidemia, unspecified hyperlipidemia type    - ALT; Future  - AST; Future  - Lipid Panel; Future  continue current management  3. Gastroesophageal reflux disease, esophagitis presence not specified    continue current management  4. Abrasion of skin    - mupirocin (BACTROBAN) 2 % cream; Apply 3 times daily. Dispense: 15 g; Refill: 0    5. Depression, unspecified depression type    continue current management  6. Chronic kidney disease, stage III (moderate) ([address]  Continue management by patient's nephrologist    7. Heart block AV third degree [facility])  continue management by patient's cardiologist    8. Atrial fibrillation, unspecified type [facility])  Continue management by patient's cardiologist          Plan:       ARIC Orozco received counseling on the following healthy behaviors: nutrition and medication adherence  Reviewed prior labs and health maintenance  Continue current medications, diet and exercise. Discussed use, benefit, and side effects of prescribed medications. Barriers to medication compliance addressed. Patient given educational materials - see patient instructions  Was a self-tracking handout given in paper form or via [employer]? No:     Requested Prescriptions     Signed Prescriptions Disp Refills    mupirocin (BACTROBAN) 2 % cream 15 g 0     Sig: Apply 3 times daily. All patient questions answered. Patient voiced understanding. Quality Measures    Body mass index is 34.33 kg/m². Elevated. Weight control planned discussed Healthy diet . BP: 110/70.  Blood pressure is normal. Treatment plan consists of No treatment change needed. Fall Risk 5/6/2019 2/19/2018   2 or more falls in past year? no no   Fall with injury in past year? no no     The patient does not have a history of falls. I did not - not indicated , complete a risk assessment for falls. A plan of care for falls No Treatment plan indicated    Lab Results   Component Value Date    LDLCALC 101 08/06/2018    (goal LDL reduction with dx if diabetes is 50% LDL reduction)    PHQ Scores 5/6/2019 2/19/2018   PHQ2 Score 0 0   PHQ9 Score 0 0     Interpretation of Total Score Depression Severity: 1-4 = Minimal depression, 5-9 = Mild depression, 10-14 = Moderate depression, 15-19 = Moderately severe depression, 20-27 = Severe depression    Orders Placed This Encounter   Procedures    ALT     Standing Status:   Future     Standing Expiration Date:   5/5/2020    AST     Standing Status:   Future     Standing Expiration Date:   5/5/2020    Lipid Panel     Standing Status:   Future     Standing Expiration Date:   5/5/2020     Order Specific Question:   Is Patient Fasting?/# of Hours     Answer: Fast 8-10 hours     Orders Placed This Encounter   Medications    mupirocin (BACTROBAN) 2 % cream     Sig: Apply 3 times daily.      Dispense:  15 g     Refill:  0         Continue routine medication  Follow-up in 6 months or sooner if needed

## 2019-05-13 LAB
ALT SERPL-CCNC: 4 U/L
AST SERPL-CCNC: 12 U/L
CHOLESTEROL, TOTAL: 152 MG/DL
CHOLESTEROL/HDL RATIO: 2.7
HDLC SERPL-MCNC: 57 MG/DL (ref 35–70)
LDL CHOLESTEROL CALCULATED: 82 MG/DL (ref 0–160)
TRIGL SERPL-MCNC: 67 MG/DL
VLDLC SERPL CALC-MCNC: 13 MG/DL

## 2019-05-14 DIAGNOSIS — I10 ESSENTIAL HYPERTENSION: ICD-10-CM

## 2019-05-14 DIAGNOSIS — E78.5 HYPERLIPIDEMIA, UNSPECIFIED HYPERLIPIDEMIA TYPE: ICD-10-CM

## 2019-07-02 ENCOUNTER — TELEPHONE (OUTPATIENT)
Dept: FAMILY MEDICINE CLINIC | Age: 84
End: 2019-07-02

## 2019-07-02 NOTE — TELEPHONE ENCOUNTER
Laverne Ojeda, patient's nephew (POA) called. He stated that patient saw Dr. Magen Bender (Anthony) for thyroid on 7-1-19. They did lab work and her hgb came back at 7.8. The nephew was told to take patient to ER. He agreed.
3

## 2019-07-12 ENCOUNTER — OFFICE VISIT (OUTPATIENT)
Dept: FAMILY MEDICINE CLINIC | Age: 84
End: 2019-07-12
Payer: MEDICARE

## 2019-07-12 VITALS
TEMPERATURE: 97.8 F | SYSTOLIC BLOOD PRESSURE: 122 MMHG | RESPIRATION RATE: 14 BRPM | WEIGHT: 200 LBS | HEART RATE: 60 BPM | DIASTOLIC BLOOD PRESSURE: 60 MMHG | BODY MASS INDEX: 34.33 KG/M2

## 2019-07-12 DIAGNOSIS — S70.02XA HEMATOMA OF LEFT HIP, INITIAL ENCOUNTER: ICD-10-CM

## 2019-07-12 DIAGNOSIS — E78.5 HYPERLIPIDEMIA, UNSPECIFIED HYPERLIPIDEMIA TYPE: ICD-10-CM

## 2019-07-12 DIAGNOSIS — D64.9 ANEMIA, UNSPECIFIED TYPE: Primary | ICD-10-CM

## 2019-07-12 DIAGNOSIS — I10 ESSENTIAL HYPERTENSION: ICD-10-CM

## 2019-07-12 DIAGNOSIS — F32.A DEPRESSION, UNSPECIFIED DEPRESSION TYPE: ICD-10-CM

## 2019-07-12 DIAGNOSIS — Z23 NEED FOR PNEUMOCOCCAL VACCINE: ICD-10-CM

## 2019-07-12 PROCEDURE — 1123F ACP DISCUSS/DSCN MKR DOCD: CPT | Performed by: NURSE PRACTITIONER

## 2019-07-12 PROCEDURE — G8427 DOCREV CUR MEDS BY ELIG CLIN: HCPCS | Performed by: NURSE PRACTITIONER

## 2019-07-12 PROCEDURE — 1090F PRES/ABSN URINE INCON ASSESS: CPT | Performed by: NURSE PRACTITIONER

## 2019-07-12 PROCEDURE — 90670 PCV13 VACCINE IM: CPT | Performed by: NURSE PRACTITIONER

## 2019-07-12 PROCEDURE — 4040F PNEUMOC VAC/ADMIN/RCVD: CPT | Performed by: NURSE PRACTITIONER

## 2019-07-12 PROCEDURE — G0009 ADMIN PNEUMOCOCCAL VACCINE: HCPCS | Performed by: NURSE PRACTITIONER

## 2019-07-12 PROCEDURE — 1036F TOBACCO NON-USER: CPT | Performed by: NURSE PRACTITIONER

## 2019-07-12 PROCEDURE — 99214 OFFICE O/P EST MOD 30 MIN: CPT | Performed by: NURSE PRACTITIONER

## 2019-07-12 PROCEDURE — G8417 CALC BMI ABV UP PARAM F/U: HCPCS | Performed by: NURSE PRACTITIONER

## 2019-07-12 ASSESSMENT — ENCOUNTER SYMPTOMS
ABDOMINAL PAIN: 0
SHORTNESS OF BREATH: 0
NAUSEA: 0
WHEEZING: 0
CHEST TIGHTNESS: 0
VOMITING: 0

## 2019-07-26 LAB
BASOPHILS ABSOLUTE: ABNORMAL /ΜL
BASOPHILS RELATIVE PERCENT: ABNORMAL %
EOSINOPHILS ABSOLUTE: ABNORMAL /ΜL
EOSINOPHILS RELATIVE PERCENT: ABNORMAL %
FERRITIN: 230 NG/ML (ref 9–150)
HCT VFR BLD CALC: 26.1 % (ref 36–46)
HEMOGLOBIN: 8.9 G/DL (ref 12–16)
IRON: 51
LYMPHOCYTES ABSOLUTE: ABNORMAL /ΜL
LYMPHOCYTES RELATIVE PERCENT: ABNORMAL %
MCH RBC QN AUTO: 31.3 PG
MCHC RBC AUTO-ENTMCNC: 34.3 G/DL
MCV RBC AUTO: 92 FL
MONOCYTES ABSOLUTE: ABNORMAL /ΜL
MONOCYTES RELATIVE PERCENT: ABNORMAL %
NEUTROPHILS ABSOLUTE: ABNORMAL /ΜL
NEUTROPHILS RELATIVE PERCENT: ABNORMAL %
PLATELET # BLD: 213 K/ΜL
PMV BLD AUTO: 7.4 FL
RBC # BLD: 2.85 10^6/ΜL
TOTAL IRON BINDING CAPACITY: 307
WBC # BLD: 4.5 10^3/ML

## 2019-07-30 DIAGNOSIS — D64.9 ANEMIA, UNSPECIFIED TYPE: ICD-10-CM

## 2019-08-19 ENCOUNTER — OFFICE VISIT (OUTPATIENT)
Dept: FAMILY MEDICINE CLINIC | Age: 84
End: 2019-08-19
Payer: MEDICARE

## 2019-08-19 ENCOUNTER — OFFICE VISIT (OUTPATIENT)
Dept: PODIATRY | Age: 84
End: 2019-08-19
Payer: MEDICARE

## 2019-08-19 VITALS — WEIGHT: 200 LBS | BODY MASS INDEX: 31.39 KG/M2 | HEIGHT: 67 IN

## 2019-08-19 VITALS
RESPIRATION RATE: 14 BRPM | WEIGHT: 200 LBS | BODY MASS INDEX: 34.33 KG/M2 | SYSTOLIC BLOOD PRESSURE: 130 MMHG | TEMPERATURE: 96.7 F | HEART RATE: 68 BPM | DIASTOLIC BLOOD PRESSURE: 60 MMHG

## 2019-08-19 DIAGNOSIS — S70.02XD HEMATOMA OF LEFT HIP, SUBSEQUENT ENCOUNTER: ICD-10-CM

## 2019-08-19 DIAGNOSIS — B35.1 ONYCHOMYCOSIS: Primary | ICD-10-CM

## 2019-08-19 DIAGNOSIS — M79.675 PAIN IN TOES OF BOTH FEET: ICD-10-CM

## 2019-08-19 DIAGNOSIS — F32.A DEPRESSION, UNSPECIFIED DEPRESSION TYPE: ICD-10-CM

## 2019-08-19 DIAGNOSIS — M79.674 PAIN IN TOES OF BOTH FEET: ICD-10-CM

## 2019-08-19 DIAGNOSIS — D64.9 ANEMIA, UNSPECIFIED TYPE: Primary | ICD-10-CM

## 2019-08-19 DIAGNOSIS — N18.30 STAGE 3 CHRONIC KIDNEY DISEASE (HCC): ICD-10-CM

## 2019-08-19 PROCEDURE — 1123F ACP DISCUSS/DSCN MKR DOCD: CPT | Performed by: NURSE PRACTITIONER

## 2019-08-19 PROCEDURE — G8417 CALC BMI ABV UP PARAM F/U: HCPCS | Performed by: NURSE PRACTITIONER

## 2019-08-19 PROCEDURE — 4040F PNEUMOC VAC/ADMIN/RCVD: CPT | Performed by: NURSE PRACTITIONER

## 2019-08-19 PROCEDURE — G8427 DOCREV CUR MEDS BY ELIG CLIN: HCPCS | Performed by: NURSE PRACTITIONER

## 2019-08-19 PROCEDURE — 99214 OFFICE O/P EST MOD 30 MIN: CPT | Performed by: NURSE PRACTITIONER

## 2019-08-19 PROCEDURE — 1036F TOBACCO NON-USER: CPT | Performed by: NURSE PRACTITIONER

## 2019-08-19 PROCEDURE — 1090F PRES/ABSN URINE INCON ASSESS: CPT | Performed by: NURSE PRACTITIONER

## 2019-08-19 PROCEDURE — 11721 DEBRIDE NAIL 6 OR MORE: CPT | Performed by: PODIATRIST

## 2019-08-19 PROCEDURE — 99999 PR OFFICE/OUTPT VISIT,PROCEDURE ONLY: CPT | Performed by: PODIATRIST

## 2019-12-10 PROBLEM — R41.82 ALTERED MENTAL STATUS: Status: ACTIVE | Noted: 2017-12-29

## 2020-01-01 ENCOUNTER — APPOINTMENT (OUTPATIENT)
Dept: GENERAL RADIOLOGY | Age: 85
End: 2020-01-01
Payer: MEDICARE

## 2020-01-01 ENCOUNTER — OFFICE VISIT (OUTPATIENT)
Dept: ORTHOPEDIC SURGERY | Age: 85
End: 2020-01-01

## 2020-01-01 ENCOUNTER — HOSPITAL ENCOUNTER (OUTPATIENT)
Age: 85
Setting detail: SPECIMEN
Discharge: HOME | End: 2020-03-16
Payer: MEDICARE

## 2020-01-01 ENCOUNTER — TELEPHONE (OUTPATIENT)
Dept: FAMILY MEDICINE CLINIC | Age: 85
End: 2020-01-01

## 2020-01-01 ENCOUNTER — ANESTHESIA (OUTPATIENT)
Dept: OPERATING ROOM | Age: 85
DRG: 563 | End: 2020-01-01
Payer: MEDICARE

## 2020-01-01 ENCOUNTER — APPOINTMENT (OUTPATIENT)
Dept: GENERAL RADIOLOGY | Age: 85
DRG: 563 | End: 2020-01-01
Payer: MEDICARE

## 2020-01-01 ENCOUNTER — OFFICE VISIT (OUTPATIENT)
Dept: PODIATRY | Age: 85
End: 2020-01-01
Payer: MEDICARE

## 2020-01-01 ENCOUNTER — HOSPITAL ENCOUNTER (OUTPATIENT)
Age: 85
Setting detail: SPECIMEN
Discharge: HOME | End: 2020-08-12
Payer: MEDICARE

## 2020-01-01 ENCOUNTER — HOSPITAL ENCOUNTER (OUTPATIENT)
Age: 85
Setting detail: SPECIMEN
Discharge: HOME | End: 2020-06-08
Payer: MEDICARE

## 2020-01-01 ENCOUNTER — HOSPITAL ENCOUNTER (OUTPATIENT)
Age: 85
Setting detail: SPECIMEN
Discharge: HOME | End: 2020-06-11
Payer: MEDICARE

## 2020-01-01 ENCOUNTER — TELEPHONE (OUTPATIENT)
Dept: INTERNAL MEDICINE CLINIC | Age: 85
End: 2020-01-01

## 2020-01-01 ENCOUNTER — TELEPHONE (OUTPATIENT)
Dept: ORTHOPEDIC SURGERY | Age: 85
End: 2020-01-01

## 2020-01-01 ENCOUNTER — HOSPITAL ENCOUNTER (EMERGENCY)
Age: 85
Discharge: HOME | End: 2020-06-22
Attending: EMERGENCY MEDICINE
Payer: MEDICARE

## 2020-01-01 ENCOUNTER — HOSPITAL ENCOUNTER (OUTPATIENT)
Age: 85
Setting detail: SPECIMEN
Discharge: HOME | End: 2020-07-21
Payer: MEDICARE

## 2020-01-01 ENCOUNTER — HOSPITAL ENCOUNTER (OUTPATIENT)
Age: 85
Setting detail: SPECIMEN
Discharge: HOME | End: 2020-05-29
Payer: MEDICARE

## 2020-01-01 ENCOUNTER — ANESTHESIA EVENT (OUTPATIENT)
Dept: OPERATING ROOM | Age: 85
DRG: 563 | End: 2020-01-01
Payer: MEDICARE

## 2020-01-01 ENCOUNTER — HOSPITAL ENCOUNTER (OUTPATIENT)
Age: 85
Setting detail: SPECIMEN
Discharge: HOME | End: 2020-06-24
Payer: MEDICARE

## 2020-01-01 ENCOUNTER — OFFICE VISIT (OUTPATIENT)
Dept: FAMILY MEDICINE CLINIC | Age: 85
End: 2020-01-01
Payer: MEDICARE

## 2020-01-01 ENCOUNTER — HOSPITAL ENCOUNTER (OUTPATIENT)
Age: 85
Setting detail: SPECIMEN
Discharge: HOME | End: 2020-05-31
Payer: MEDICARE

## 2020-01-01 ENCOUNTER — HOSPITAL ENCOUNTER (OUTPATIENT)
Age: 85
Setting detail: SPECIMEN
Discharge: HOME | End: 2020-03-12
Payer: MEDICARE

## 2020-01-01 ENCOUNTER — CARE COORDINATION (OUTPATIENT)
Dept: CARE COORDINATION | Age: 85
End: 2020-01-01

## 2020-01-01 ENCOUNTER — HOSPITAL ENCOUNTER (EMERGENCY)
Age: 85
Discharge: HOME | End: 2020-06-04
Attending: EMERGENCY MEDICINE
Payer: MEDICARE

## 2020-01-01 ENCOUNTER — HOSPITAL ENCOUNTER (INPATIENT)
Age: 85
LOS: 2 days | Discharge: NURSING, REHAB OR LONG-TERM CARE | DRG: 563 | End: 2020-06-27
Attending: EMERGENCY MEDICINE | Admitting: INTERNAL MEDICINE
Payer: MEDICARE

## 2020-01-01 ENCOUNTER — HOSPITAL ENCOUNTER (OUTPATIENT)
Age: 85
Setting detail: SPECIMEN
Discharge: HOME | End: 2020-06-30
Payer: MEDICARE

## 2020-01-01 ENCOUNTER — HOSPITAL ENCOUNTER (OUTPATIENT)
Age: 85
Setting detail: SPECIMEN
Discharge: HOME | End: 2020-05-28
Payer: MEDICARE

## 2020-01-01 VITALS
RESPIRATION RATE: 18 BRPM | OXYGEN SATURATION: 97 % | HEART RATE: 70 BPM | DIASTOLIC BLOOD PRESSURE: 67 MMHG | TEMPERATURE: 97.6 F | SYSTOLIC BLOOD PRESSURE: 120 MMHG

## 2020-01-01 VITALS
WEIGHT: 160 LBS | SYSTOLIC BLOOD PRESSURE: 130 MMHG | TEMPERATURE: 97.6 F | OXYGEN SATURATION: 95 % | DIASTOLIC BLOOD PRESSURE: 95 MMHG | HEART RATE: 69 BPM | RESPIRATION RATE: 28 BRPM | HEIGHT: 67 IN | BODY MASS INDEX: 25.11 KG/M2

## 2020-01-01 VITALS — HEIGHT: 64 IN | BODY MASS INDEX: 27.31 KG/M2 | TEMPERATURE: 97.9 F | WEIGHT: 160 LBS

## 2020-01-01 VITALS — OXYGEN SATURATION: 100 % | TEMPERATURE: 96 F | DIASTOLIC BLOOD PRESSURE: 82 MMHG | SYSTOLIC BLOOD PRESSURE: 162 MMHG

## 2020-01-01 VITALS — WEIGHT: 182 LBS | BODY MASS INDEX: 31.07 KG/M2 | HEIGHT: 64 IN

## 2020-01-01 VITALS
OXYGEN SATURATION: 97 % | BODY MASS INDEX: 23.89 KG/M2 | TEMPERATURE: 97.5 F | SYSTOLIC BLOOD PRESSURE: 143 MMHG | RESPIRATION RATE: 21 BRPM | HEIGHT: 67 IN | DIASTOLIC BLOOD PRESSURE: 61 MMHG | HEART RATE: 70 BPM | WEIGHT: 152.2 LBS

## 2020-01-01 VITALS
DIASTOLIC BLOOD PRESSURE: 60 MMHG | HEART RATE: 72 BPM | BODY MASS INDEX: 27.57 KG/M2 | WEIGHT: 160.6 LBS | SYSTOLIC BLOOD PRESSURE: 110 MMHG

## 2020-01-01 VITALS — BODY MASS INDEX: 23.88 KG/M2 | WEIGHT: 152.12 LBS | TEMPERATURE: 97.5 F | HEIGHT: 67 IN

## 2020-01-01 VITALS — HEIGHT: 67 IN | TEMPERATURE: 97.2 F | WEIGHT: 152 LBS | BODY MASS INDEX: 23.86 KG/M2

## 2020-01-01 DIAGNOSIS — S53.105D CLOSED DISLOCATION OF LEFT ELBOW, SUBSEQUENT ENCOUNTER: Primary | ICD-10-CM

## 2020-01-01 LAB
-: ABNORMAL
ABSOLUTE EOS #: 0.06 K/UL (ref 0–0.44)
ABSOLUTE EOS #: 0.1 K/UL (ref 0–0.4)
ABSOLUTE EOS #: 0.1 K/UL (ref 0–0.44)
ABSOLUTE IMMATURE GRANULOCYTE: 0.09 K/UL (ref 0–0.3)
ABSOLUTE IMMATURE GRANULOCYTE: 0.18 K/UL (ref 0–0.3)
ABSOLUTE IMMATURE GRANULOCYTE: ABNORMAL K/UL (ref 0–0.3)
ABSOLUTE LYMPH #: 1.17 K/UL (ref 1.1–3.7)
ABSOLUTE LYMPH #: 1.5 K/UL (ref 1–4.8)
ABSOLUTE LYMPH #: 2 K/UL (ref 1.1–3.7)
ABSOLUTE MONO #: 0.5 K/UL (ref 0.1–1.2)
ABSOLUTE MONO #: 0.67 K/UL (ref 0.1–1.2)
ABSOLUTE MONO #: 0.9 K/UL (ref 0.1–1.3)
ALBUMIN SERPL-MCNC: 2.6 G/DL (ref 3.5–5.2)
ALBUMIN/GLOBULIN RATIO: 0.7 (ref 1–2.5)
ALP BLD-CCNC: 80 U/L (ref 35–104)
ALT SERPL-CCNC: 6 U/L (ref 5–33)
AMORPHOUS: ABNORMAL
ANION GAP SERPL CALCULATED.3IONS-SCNC: 11 MMOL/L (ref 9–17)
ANION GAP SERPL CALCULATED.3IONS-SCNC: 12 MMOL/L (ref 9–17)
ANION GAP SERPL CALCULATED.3IONS-SCNC: 13 MMOL/L (ref 9–17)
ANION GAP SERPL CALCULATED.3IONS-SCNC: 14 MMOL/L (ref 9–17)
ANION GAP SERPL CALCULATED.3IONS-SCNC: 14 MMOL/L (ref 9–17)
ANION GAP SERPL CALCULATED.3IONS-SCNC: 17 MMOL/L (ref 9–17)
AST SERPL-CCNC: 20 U/L
BACTERIA: ABNORMAL
BASOPHILS # BLD: 0 % (ref 0–2)
BASOPHILS # BLD: 1 % (ref 0–2)
BASOPHILS # BLD: 1 % (ref 0–2)
BASOPHILS ABSOLUTE: 0.04 K/UL (ref 0–0.2)
BASOPHILS ABSOLUTE: 0.05 K/UL (ref 0–0.2)
BASOPHILS ABSOLUTE: 0.1 K/UL (ref 0–0.2)
BILIRUB SERPL-MCNC: 0.45 MG/DL (ref 0.3–1.2)
BILIRUBIN URINE: NEGATIVE
BUN BLDV-MCNC: 22 MG/DL (ref 8–23)
BUN BLDV-MCNC: 23 MG/DL (ref 8–23)
BUN BLDV-MCNC: 26 MG/DL (ref 8–23)
BUN BLDV-MCNC: 27 MG/DL (ref 8–23)
BUN BLDV-MCNC: 31 MG/DL (ref 8–23)
BUN BLDV-MCNC: 35 MG/DL (ref 8–23)
BUN BLDV-MCNC: 44 MG/DL (ref 8–23)
BUN BLDV-MCNC: 45 MG/DL (ref 8–23)
BUN BLDV-MCNC: 51 MG/DL (ref 8–23)
BUN/CREAT BLD: ABNORMAL (ref 9–20)
C-REACTIVE PROTEIN: 37.9 MG/L (ref 0–5)
CALCIUM SERPL-MCNC: 8.1 MG/DL (ref 8.6–10.4)
CALCIUM SERPL-MCNC: 8.6 MG/DL (ref 8.6–10.4)
CALCIUM SERPL-MCNC: 8.7 MG/DL (ref 8.6–10.4)
CALCIUM SERPL-MCNC: 8.8 MG/DL (ref 8.6–10.4)
CALCIUM SERPL-MCNC: 8.9 MG/DL (ref 8.6–10.4)
CALCIUM SERPL-MCNC: 9 MG/DL (ref 8.6–10.4)
CALCIUM SERPL-MCNC: 9.1 MG/DL (ref 8.6–10.4)
CALCIUM SERPL-MCNC: 9.2 MG/DL (ref 8.6–10.4)
CALCIUM SERPL-MCNC: 9.3 MG/DL (ref 8.6–10.4)
CASTS UA: ABNORMAL /LPF
CHLORIDE BLD-SCNC: 100 MMOL/L (ref 98–107)
CHLORIDE BLD-SCNC: 100 MMOL/L (ref 98–107)
CHLORIDE BLD-SCNC: 101 MMOL/L (ref 98–107)
CHLORIDE BLD-SCNC: 106 MMOL/L (ref 98–107)
CHLORIDE BLD-SCNC: 107 MMOL/L (ref 98–107)
CHLORIDE BLD-SCNC: 109 MMOL/L (ref 98–107)
CHLORIDE BLD-SCNC: 111 MMOL/L (ref 98–107)
CHLORIDE BLD-SCNC: 97 MMOL/L (ref 98–107)
CHLORIDE BLD-SCNC: 99 MMOL/L (ref 98–107)
CO2: 18 MMOL/L (ref 20–31)
CO2: 21 MMOL/L (ref 20–31)
CO2: 21 MMOL/L (ref 20–31)
CO2: 22 MMOL/L (ref 20–31)
CO2: 22 MMOL/L (ref 20–31)
CO2: 23 MMOL/L (ref 20–31)
CO2: 24 MMOL/L (ref 20–31)
CO2: 24 MMOL/L (ref 20–31)
CO2: 29 MMOL/L (ref 20–31)
COLOR: YELLOW
COMMENT UA: ABNORMAL
CREAT SERPL-MCNC: 0.83 MG/DL (ref 0.5–0.9)
CREAT SERPL-MCNC: 0.86 MG/DL (ref 0.5–0.9)
CREAT SERPL-MCNC: 1.06 MG/DL (ref 0.5–0.9)
CREAT SERPL-MCNC: 1.08 MG/DL (ref 0.5–0.9)
CREAT SERPL-MCNC: 1.1 MG/DL (ref 0.5–0.9)
CREAT SERPL-MCNC: 1.34 MG/DL (ref 0.5–0.9)
CREAT SERPL-MCNC: 1.35 MG/DL (ref 0.5–0.9)
CREAT SERPL-MCNC: 1.49 MG/DL (ref 0.5–0.9)
CREAT SERPL-MCNC: 1.6 MG/DL (ref 0.5–0.9)
CRYSTALS, UA: ABNORMAL /HPF
CULTURE: ABNORMAL
D-DIMER QUANTITATIVE: 3.61 MG/L FEU
DIFFERENTIAL TYPE: ABNORMAL
EKG ATRIAL RATE: 70 BPM
EKG P AXIS: -15 DEGREES
EKG P-R INTERVAL: 336 MS
EKG Q-T INTERVAL: 444 MS
EKG QRS DURATION: 160 MS
EKG QTC CALCULATION (BAZETT): 479 MS
EKG R AXIS: -72 DEGREES
EKG T AXIS: 98 DEGREES
EKG VENTRICULAR RATE: 70 BPM
EOSINOPHILS RELATIVE PERCENT: 1 % (ref 0–4)
EOSINOPHILS RELATIVE PERCENT: 1 % (ref 1–4)
EOSINOPHILS RELATIVE PERCENT: 2 % (ref 1–4)
EPITHELIAL CELLS UA: ABNORMAL /HPF (ref 0–5)
FERRITIN: 416 UG/L (ref 13–150)
FERRITIN: 482 UG/L (ref 13–150)
FIBRINOGEN: 195 MG/DL (ref 140–420)
GFR AFRICAN AMERICAN: 36 ML/MIN
GFR AFRICAN AMERICAN: 39 ML/MIN
GFR AFRICAN AMERICAN: 44 ML/MIN
GFR AFRICAN AMERICAN: 44 ML/MIN
GFR AFRICAN AMERICAN: 55 ML/MIN
GFR AFRICAN AMERICAN: 57 ML/MIN
GFR AFRICAN AMERICAN: 58 ML/MIN
GFR AFRICAN AMERICAN: >60 ML/MIN
GFR AFRICAN AMERICAN: >60 ML/MIN
GFR NON-AFRICAN AMERICAN: 30 ML/MIN
GFR NON-AFRICAN AMERICAN: 32 ML/MIN
GFR NON-AFRICAN AMERICAN: 36 ML/MIN
GFR NON-AFRICAN AMERICAN: 36 ML/MIN
GFR NON-AFRICAN AMERICAN: 46 ML/MIN
GFR NON-AFRICAN AMERICAN: 47 ML/MIN
GFR NON-AFRICAN AMERICAN: 48 ML/MIN
GFR NON-AFRICAN AMERICAN: >60 ML/MIN
GFR NON-AFRICAN AMERICAN: >60 ML/MIN
GFR SERPL CREATININE-BSD FRML MDRD: ABNORMAL ML/MIN/{1.73_M2}
GLUCOSE BLD-MCNC: 104 MG/DL (ref 70–99)
GLUCOSE BLD-MCNC: 107 MG/DL (ref 70–99)
GLUCOSE BLD-MCNC: 109 MG/DL (ref 70–99)
GLUCOSE BLD-MCNC: 110 MG/DL (ref 70–99)
GLUCOSE BLD-MCNC: 119 MG/DL (ref 70–99)
GLUCOSE BLD-MCNC: 78 MG/DL (ref 70–99)
GLUCOSE BLD-MCNC: 78 MG/DL (ref 70–99)
GLUCOSE BLD-MCNC: 83 MG/DL (ref 70–99)
GLUCOSE BLD-MCNC: 85 MG/DL (ref 70–99)
GLUCOSE URINE: NEGATIVE
HCT VFR BLD CALC: 26.4 % (ref 36.3–47.1)
HCT VFR BLD CALC: 27.2 % (ref 36.3–47.1)
HCT VFR BLD CALC: 28.4 % (ref 36–46)
HCT VFR BLD CALC: 28.5 % (ref 36.3–47.1)
HCT VFR BLD CALC: 30 % (ref 36.3–47.1)
HCT VFR BLD CALC: 30.6 % (ref 36.3–47.1)
HCT VFR BLD CALC: 30.8 % (ref 36.3–47.1)
HCT VFR BLD CALC: 31.2 % (ref 36.3–47.1)
HCT VFR BLD CALC: 32.1 % (ref 36.3–47.1)
HCT VFR BLD CALC: 33.4 % (ref 36.3–47.1)
HCT VFR BLD CALC: 33.6 % (ref 36.3–47.1)
HEMOGLOBIN: 10.4 G/DL (ref 11.9–15.1)
HEMOGLOBIN: 7.9 G/DL (ref 11.9–15.1)
HEMOGLOBIN: 8.1 G/DL (ref 11.9–15.1)
HEMOGLOBIN: 8.5 G/DL (ref 11.9–15.1)
HEMOGLOBIN: 8.9 G/DL (ref 12–16)
HEMOGLOBIN: 9.1 G/DL (ref 11.9–15.1)
HEMOGLOBIN: 9.2 G/DL (ref 11.9–15.1)
HEMOGLOBIN: 9.3 G/DL (ref 11.9–15.1)
HEMOGLOBIN: 9.5 G/DL (ref 11.9–15.1)
IMMATURE GRANULOCYTES: 1 %
IMMATURE GRANULOCYTES: 3 %
IMMATURE GRANULOCYTES: ABNORMAL %
INR BLD: 1.1
INR BLD: 1.2
KETONES, URINE: NEGATIVE
LACTATE DEHYDROGENASE: 370 U/L (ref 135–214)
LEUKOCYTE ESTERASE, URINE: ABNORMAL
LYMPHOCYTES # BLD: 12 % (ref 24–43)
LYMPHOCYTES # BLD: 15 % (ref 24–44)
LYMPHOCYTES # BLD: 33 % (ref 24–43)
Lab: ABNORMAL
MCH RBC QN AUTO: 26.8 PG (ref 25.2–33.5)
MCH RBC QN AUTO: 27 PG (ref 25.2–33.5)
MCH RBC QN AUTO: 27.2 PG (ref 25.2–33.5)
MCH RBC QN AUTO: 27.4 PG (ref 26–34)
MCH RBC QN AUTO: 27.7 PG (ref 25.2–33.5)
MCH RBC QN AUTO: 28 PG (ref 25.2–33.5)
MCH RBC QN AUTO: 28.3 PG (ref 25.2–33.5)
MCH RBC QN AUTO: 28.5 PG (ref 25.2–33.5)
MCH RBC QN AUTO: 28.6 PG (ref 25.2–33.5)
MCHC RBC AUTO-ENTMCNC: 28.3 G/DL (ref 28.4–34.8)
MCHC RBC AUTO-ENTMCNC: 28.4 G/DL (ref 28.4–34.8)
MCHC RBC AUTO-ENTMCNC: 29 G/DL (ref 28.4–34.8)
MCHC RBC AUTO-ENTMCNC: 29.5 G/DL (ref 28.4–34.8)
MCHC RBC AUTO-ENTMCNC: 29.8 G/DL (ref 28.4–34.8)
MCHC RBC AUTO-ENTMCNC: 29.9 G/DL (ref 28.4–34.8)
MCHC RBC AUTO-ENTMCNC: 30.7 G/DL (ref 28.4–34.8)
MCHC RBC AUTO-ENTMCNC: 31 G/DL (ref 28.4–34.8)
MCHC RBC AUTO-ENTMCNC: 31.2 G/DL (ref 31–37)
MCV RBC AUTO: 100.3 FL (ref 82.6–102.9)
MCV RBC AUTO: 87.7 FL (ref 80–100)
MCV RBC AUTO: 90.5 FL (ref 82.6–102.9)
MCV RBC AUTO: 91 FL (ref 82.6–102.9)
MCV RBC AUTO: 91.3 FL (ref 82.6–102.9)
MCV RBC AUTO: 92.3 FL (ref 82.6–102.9)
MCV RBC AUTO: 94.8 FL (ref 82.6–102.9)
MCV RBC AUTO: 95.4 FL (ref 82.6–102.9)
MCV RBC AUTO: 96.1 FL (ref 82.6–102.9)
MONOCYTES # BLD: 11 % (ref 3–12)
MONOCYTES # BLD: 5 % (ref 3–12)
MONOCYTES # BLD: 9 % (ref 1–7)
MUCUS: ABNORMAL
NITRITE, URINE: POSITIVE
NRBC AUTOMATED: 0 PER 100 WBC
NRBC AUTOMATED: ABNORMAL PER 100 WBC
OTHER OBSERVATIONS UA: ABNORMAL
PARTIAL THROMBOPLASTIN TIME: 18.8 SEC (ref 20.5–30.5)
PDW BLD-RTO: 16.3 % (ref 11.8–14.4)
PDW BLD-RTO: 16.4 % (ref 11.8–14.4)
PDW BLD-RTO: 16.7 % (ref 11.8–14.4)
PDW BLD-RTO: 17.3 % (ref 11.8–14.4)
PDW BLD-RTO: 17.4 % (ref 11.8–14.4)
PDW BLD-RTO: 17.7 % (ref 11.8–14.4)
PDW BLD-RTO: 18 % (ref 11.8–14.4)
PDW BLD-RTO: 18.4 % (ref 11.8–14.4)
PDW BLD-RTO: 18.8 % (ref 11.5–14.9)
PH UA: 5.5 (ref 5–8)
PLATELET # BLD: 254 K/UL (ref 138–453)
PLATELET # BLD: 274 K/UL (ref 138–453)
PLATELET # BLD: 303 K/UL (ref 138–453)
PLATELET # BLD: 304 K/UL (ref 138–453)
PLATELET # BLD: 308 K/UL (ref 138–453)
PLATELET # BLD: 317 K/UL (ref 150–450)
PLATELET # BLD: 333 K/UL (ref 138–453)
PLATELET # BLD: 365 K/UL (ref 138–453)
PLATELET # BLD: ABNORMAL K/UL (ref 138–453)
PLATELET ESTIMATE: ABNORMAL
PLATELET, FLUORESCENCE: 268 K/UL (ref 138–453)
PLATELET, IMMATURE FRACTION: 1.2 % (ref 1.1–10.3)
PMV BLD AUTO: 7.2 FL (ref 6–12)
PMV BLD AUTO: 9 FL (ref 8.1–13.5)
PMV BLD AUTO: 9.1 FL (ref 8.1–13.5)
PMV BLD AUTO: 9.1 FL (ref 8.1–13.5)
PMV BLD AUTO: 9.2 FL (ref 8.1–13.5)
PMV BLD AUTO: 9.2 FL (ref 8.1–13.5)
PMV BLD AUTO: 9.4 FL (ref 8.1–13.5)
PMV BLD AUTO: 9.4 FL (ref 8.1–13.5)
PMV BLD AUTO: ABNORMAL FL (ref 8.1–13.5)
POTASSIUM SERPL-SCNC: 3.4 MMOL/L (ref 3.7–5.3)
POTASSIUM SERPL-SCNC: 3.9 MMOL/L (ref 3.7–5.3)
POTASSIUM SERPL-SCNC: 4 MMOL/L (ref 3.7–5.3)
POTASSIUM SERPL-SCNC: 4.2 MMOL/L (ref 3.7–5.3)
POTASSIUM SERPL-SCNC: 4.3 MMOL/L (ref 3.7–5.3)
POTASSIUM SERPL-SCNC: 4.4 MMOL/L (ref 3.7–5.3)
POTASSIUM SERPL-SCNC: 4.5 MMOL/L (ref 3.7–5.3)
POTASSIUM SERPL-SCNC: 4.8 MMOL/L (ref 3.7–5.3)
PROTEIN UA: NEGATIVE
PROTHROMBIN TIME: 11.8 SEC (ref 9–12)
PROTHROMBIN TIME: 14.8 SEC (ref 11.8–14.6)
RBC # BLD: 2.85 M/UL (ref 3.95–5.11)
RBC # BLD: 2.89 M/UL (ref 3.95–5.11)
RBC # BLD: 3.15 M/UL (ref 3.95–5.11)
RBC # BLD: 3.24 M/UL (ref 4–5.2)
RBC # BLD: 3.25 M/UL (ref 3.95–5.11)
RBC # BLD: 3.25 M/UL (ref 3.95–5.11)
RBC # BLD: 3.33 M/UL (ref 3.95–5.11)
RBC # BLD: 3.34 M/UL (ref 3.95–5.11)
RBC # BLD: 3.43 M/UL (ref 3.95–5.11)
RBC # BLD: ABNORMAL 10*6/UL
RBC UA: ABNORMAL /HPF (ref 0–2)
RENAL EPITHELIAL, UA: ABNORMAL /HPF
SARS-COV-2, PCR: ABNORMAL
SARS-COV-2, PCR: NORMAL
SARS-COV-2, PCR: NORMAL
SARS-COV-2, RAPID: DETECTED
SARS-COV-2, RAPID: NORMAL
SARS-COV-2, RAPID: NOT DETECTED
SARS-COV-2: ABNORMAL
SARS-COV-2: NORMAL
SARS-COV-2: NOT DETECTED
SEG NEUTROPHILS: 50 % (ref 36–65)
SEG NEUTROPHILS: 74 % (ref 36–66)
SEG NEUTROPHILS: 81 % (ref 36–65)
SEGMENTED NEUTROPHILS ABSOLUTE COUNT: 3.01 K/UL (ref 1.5–8.1)
SEGMENTED NEUTROPHILS ABSOLUTE COUNT: 7.4 K/UL (ref 1.3–9.1)
SEGMENTED NEUTROPHILS ABSOLUTE COUNT: 8.06 K/UL (ref 1.5–8.1)
SODIUM BLD-SCNC: 133 MMOL/L (ref 135–144)
SODIUM BLD-SCNC: 134 MMOL/L (ref 135–144)
SODIUM BLD-SCNC: 136 MMOL/L (ref 135–144)
SODIUM BLD-SCNC: 138 MMOL/L (ref 135–144)
SODIUM BLD-SCNC: 139 MMOL/L (ref 135–144)
SODIUM BLD-SCNC: 140 MMOL/L (ref 135–144)
SODIUM BLD-SCNC: 142 MMOL/L (ref 135–144)
SODIUM BLD-SCNC: 146 MMOL/L (ref 135–144)
SODIUM BLD-SCNC: 146 MMOL/L (ref 135–144)
SOURCE: ABNORMAL
SOURCE: NORMAL
SOURCE: NORMAL
SPECIFIC GRAVITY UA: 1.01 (ref 1–1.03)
SPECIMEN DESCRIPTION: ABNORMAL
TOTAL PROTEIN: 6.1 G/DL (ref 6.4–8.3)
TRICHOMONAS: ABNORMAL
TURBIDITY: ABNORMAL
URINE HGB: ABNORMAL
UROBILINOGEN, URINE: NORMAL
WBC # BLD: 10.1 K/UL (ref 3.5–11)
WBC # BLD: 10.7 K/UL (ref 3.5–11.3)
WBC # BLD: 5.4 K/UL (ref 3.5–11.3)
WBC # BLD: 6 K/UL (ref 3.5–11.3)
WBC # BLD: 6.3 K/UL (ref 3.5–11.3)
WBC # BLD: 7.2 K/UL (ref 3.5–11.3)
WBC # BLD: 7.8 K/UL (ref 3.5–11.3)
WBC # BLD: 8.4 K/UL (ref 3.5–11.3)
WBC # BLD: 9.9 K/UL (ref 3.5–11.3)
WBC # BLD: ABNORMAL 10*3/UL
WBC UA: ABNORMAL /HPF (ref 0–5)
YEAST: ABNORMAL

## 2020-01-01 PROCEDURE — 87186 SC STD MICRODIL/AGAR DIL: CPT

## 2020-01-01 PROCEDURE — 85027 COMPLETE CBC AUTOMATED: CPT

## 2020-01-01 PROCEDURE — 85014 HEMATOCRIT: CPT

## 2020-01-01 PROCEDURE — P9603 ONE-WAY ALLOW PRORATED MILES: HCPCS

## 2020-01-01 PROCEDURE — 87088 URINE BACTERIA CULTURE: CPT

## 2020-01-01 PROCEDURE — 81001 URINALYSIS AUTO W/SCOPE: CPT

## 2020-01-01 PROCEDURE — G8427 DOCREV CUR MEDS BY ELIG CLIN: HCPCS | Performed by: FAMILY MEDICINE

## 2020-01-01 PROCEDURE — 6360000002 HC RX W HCPCS

## 2020-01-01 PROCEDURE — U0002 COVID-19 LAB TEST NON-CDC: HCPCS

## 2020-01-01 PROCEDURE — 6360000002 HC RX W HCPCS: Performed by: ANESTHESIOLOGY

## 2020-01-01 PROCEDURE — 73090 X-RAY EXAM OF FOREARM: CPT

## 2020-01-01 PROCEDURE — 99024 POSTOP FOLLOW-UP VISIT: CPT | Performed by: ORTHOPAEDIC SURGERY

## 2020-01-01 PROCEDURE — 94770 HC ETCO2 MONITOR DAILY: CPT

## 2020-01-01 PROCEDURE — 80053 COMPREHEN METABOLIC PANEL: CPT

## 2020-01-01 PROCEDURE — 85384 FIBRINOGEN ACTIVITY: CPT

## 2020-01-01 PROCEDURE — 3600000014 HC SURGERY LEVEL 4 ADDTL 15MIN: Performed by: ORTHOPAEDIC SURGERY

## 2020-01-01 PROCEDURE — 99233 SBSQ HOSP IP/OBS HIGH 50: CPT | Performed by: INTERNAL MEDICINE

## 2020-01-01 PROCEDURE — 73070 X-RAY EXAM OF ELBOW: CPT

## 2020-01-01 PROCEDURE — 96374 THER/PROPH/DIAG INJ IV PUSH: CPT

## 2020-01-01 PROCEDURE — 83615 LACTATE (LD) (LDH) ENZYME: CPT

## 2020-01-01 PROCEDURE — 2580000003 HC RX 258: Performed by: ANESTHESIOLOGY

## 2020-01-01 PROCEDURE — 80048 BASIC METABOLIC PNL TOTAL CA: CPT

## 2020-01-01 PROCEDURE — 85018 HEMOGLOBIN: CPT

## 2020-01-01 PROCEDURE — U0003 INFECTIOUS AGENT DETECTION BY NUCLEIC ACID (DNA OR RNA); SEVERE ACUTE RESPIRATORY SYNDROME CORONAVIRUS 2 (SARS-COV-2) (CORONAVIRUS DISEASE [COVID-19]), AMPLIFIED PROBE TECHNIQUE, MAKING USE OF HIGH THROUGHPUT TECHNOLOGIES AS DESCRIBED BY CMS-2020-01-R: HCPCS

## 2020-01-01 PROCEDURE — 93010 ELECTROCARDIOGRAM REPORT: CPT | Performed by: INTERNAL MEDICINE

## 2020-01-01 PROCEDURE — 96376 TX/PRO/DX INJ SAME DRUG ADON: CPT

## 2020-01-01 PROCEDURE — 6360000002 HC RX W HCPCS: Performed by: NURSE PRACTITIONER

## 2020-01-01 PROCEDURE — 7100000001 HC PACU RECOVERY - ADDTL 15 MIN: Performed by: ORTHOPAEDIC SURGERY

## 2020-01-01 PROCEDURE — 36415 COLL VENOUS BLD VENIPUNCTURE: CPT

## 2020-01-01 PROCEDURE — 86140 C-REACTIVE PROTEIN: CPT

## 2020-01-01 PROCEDURE — 99157 MOD SED OTHER PHYS/QHP EA: CPT

## 2020-01-01 PROCEDURE — 99214 OFFICE O/P EST MOD 30 MIN: CPT | Performed by: FAMILY MEDICINE

## 2020-01-01 PROCEDURE — 73080 X-RAY EXAM OF ELBOW: CPT

## 2020-01-01 PROCEDURE — 85379 FIBRIN DEGRADATION QUANT: CPT

## 2020-01-01 PROCEDURE — C1713 ANCHOR/SCREW BN/BN,TIS/BN: HCPCS | Performed by: ORTHOPAEDIC SURGERY

## 2020-01-01 PROCEDURE — 6360000002 HC RX W HCPCS: Performed by: STUDENT IN AN ORGANIZED HEALTH CARE EDUCATION/TRAINING PROGRAM

## 2020-01-01 PROCEDURE — APPSS180 APP SPLIT SHARED TIME > 60 MINUTES: Performed by: PHYSICIAN ASSISTANT

## 2020-01-01 PROCEDURE — 6360000002 HC RX W HCPCS: Performed by: EMERGENCY MEDICINE

## 2020-01-01 PROCEDURE — 82728 ASSAY OF FERRITIN: CPT

## 2020-01-01 PROCEDURE — 71045 X-RAY EXAM CHEST 1 VIEW: CPT

## 2020-01-01 PROCEDURE — 1200000000 HC SEMI PRIVATE

## 2020-01-01 PROCEDURE — 99283 EMERGENCY DEPT VISIT LOW MDM: CPT | Performed by: ORTHOPAEDIC SURGERY

## 2020-01-01 PROCEDURE — 3600000004 HC SURGERY LEVEL 4 BASE: Performed by: ORTHOPAEDIC SURGERY

## 2020-01-01 PROCEDURE — 2709999900 HC NON-CHARGEABLE SUPPLY: Performed by: ORTHOPAEDIC SURGERY

## 2020-01-01 PROCEDURE — 6360000002 HC RX W HCPCS: Performed by: NURSE ANESTHETIST, CERTIFIED REGISTERED

## 2020-01-01 PROCEDURE — 99285 EMERGENCY DEPT VISIT HI MDM: CPT

## 2020-01-01 PROCEDURE — 3700000000 HC ANESTHESIA ATTENDED CARE: Performed by: ORTHOPAEDIC SURGERY

## 2020-01-01 PROCEDURE — 85610 PROTHROMBIN TIME: CPT

## 2020-01-01 PROCEDURE — 2580000003 HC RX 258: Performed by: STUDENT IN AN ORGANIZED HEALTH CARE EDUCATION/TRAINING PROGRAM

## 2020-01-01 PROCEDURE — G8482 FLU IMMUNIZE ORDER/ADMIN: HCPCS | Performed by: FAMILY MEDICINE

## 2020-01-01 PROCEDURE — 87086 URINE CULTURE/COLONY COUNT: CPT

## 2020-01-01 PROCEDURE — 96372 THER/PROPH/DIAG INJ SC/IM: CPT

## 2020-01-01 PROCEDURE — 99156 MOD SED OTH PHYS/QHP 5/>YRS: CPT

## 2020-01-01 PROCEDURE — 0RSM34Z REPOSITION LEFT ELBOW JOINT WITH INTERNAL FIXATION DEVICE, PERCUTANEOUS APPROACH: ICD-10-PCS | Performed by: ORTHOPAEDIC SURGERY

## 2020-01-01 PROCEDURE — 7100000000 HC PACU RECOVERY - FIRST 15 MIN: Performed by: ORTHOPAEDIC SURGERY

## 2020-01-01 PROCEDURE — 24600 TX CLSD ELBOW DISLC W/O ANES: CPT

## 2020-01-01 PROCEDURE — 99222 1ST HOSP IP/OBS MODERATE 55: CPT | Performed by: FAMILY MEDICINE

## 2020-01-01 PROCEDURE — 85730 THROMBOPLASTIN TIME PARTIAL: CPT

## 2020-01-01 PROCEDURE — 85025 COMPLETE CBC W/AUTO DIFF WBC: CPT

## 2020-01-01 PROCEDURE — 6370000000 HC RX 637 (ALT 250 FOR IP): Performed by: NURSE PRACTITIONER

## 2020-01-01 PROCEDURE — 73060 X-RAY EXAM OF HUMERUS: CPT

## 2020-01-01 PROCEDURE — 99239 HOSP IP/OBS DSCHRG MGMT >30: CPT | Performed by: FAMILY MEDICINE

## 2020-01-01 PROCEDURE — 2500000003 HC RX 250 WO HCPCS: Performed by: NURSE ANESTHETIST, CERTIFIED REGISTERED

## 2020-01-01 PROCEDURE — 3700000001 HC ADD 15 MINUTES (ANESTHESIA): Performed by: ORTHOPAEDIC SURGERY

## 2020-01-01 PROCEDURE — 99283 EMERGENCY DEPT VISIT LOW MDM: CPT

## 2020-01-01 PROCEDURE — 84132 ASSAY OF SERUM POTASSIUM: CPT

## 2020-01-01 PROCEDURE — 24605 TX CLSD ELBOW DISLC REQ ANES: CPT

## 2020-01-01 PROCEDURE — 99152 MOD SED SAME PHYS/QHP 5/>YRS: CPT

## 2020-01-01 PROCEDURE — 1036F TOBACCO NON-USER: CPT | Performed by: FAMILY MEDICINE

## 2020-01-01 PROCEDURE — 85055 RETICULATED PLATELET ASSAY: CPT

## 2020-01-01 PROCEDURE — 1090F PRES/ABSN URINE INCON ASSESS: CPT | Performed by: FAMILY MEDICINE

## 2020-01-01 PROCEDURE — 24999 UNLISTED PX HUMERUS/ELBOW: CPT | Performed by: ORTHOPAEDIC SURGERY

## 2020-01-01 PROCEDURE — 93005 ELECTROCARDIOGRAM TRACING: CPT | Performed by: STUDENT IN AN ORGANIZED HEALTH CARE EDUCATION/TRAINING PROGRAM

## 2020-01-01 PROCEDURE — G8417 CALC BMI ABV UP PARAM F/U: HCPCS | Performed by: FAMILY MEDICINE

## 2020-01-01 PROCEDURE — 2580000003 HC RX 258: Performed by: NURSE PRACTITIONER

## 2020-01-01 PROCEDURE — 99222 1ST HOSP IP/OBS MODERATE 55: CPT | Performed by: INTERNAL MEDICINE

## 2020-01-01 PROCEDURE — 24600 TX CLSD ELBOW DISLC W/O ANES: CPT | Performed by: ORTHOPAEDIC SURGERY

## 2020-01-01 PROCEDURE — 99999 PR OFFICE/OUTPT VISIT,PROCEDURE ONLY: CPT | Performed by: PODIATRIST

## 2020-01-01 PROCEDURE — 11721 DEBRIDE NAIL 6 OR MORE: CPT | Performed by: PODIATRIST

## 2020-01-01 PROCEDURE — 4040F PNEUMOC VAC/ADMIN/RCVD: CPT | Performed by: FAMILY MEDICINE

## 2020-01-01 PROCEDURE — 1123F ACP DISCUSS/DSCN MKR DOCD: CPT | Performed by: FAMILY MEDICINE

## 2020-01-01 DEVICE — WIRE FIX 5/64X9 IN KIRSCHNER: Type: IMPLANTABLE DEVICE | Site: ELBOW | Status: FUNCTIONAL

## 2020-01-01 RX ORDER — FENTANYL CITRATE 50 UG/ML
INJECTION, SOLUTION INTRAMUSCULAR; INTRAVENOUS PRN
Status: DISCONTINUED | OUTPATIENT
Start: 2020-01-01 | End: 2020-01-01 | Stop reason: SDUPTHER

## 2020-01-01 RX ORDER — PANTOPRAZOLE SODIUM 40 MG/1
40 TABLET, DELAYED RELEASE ORAL DAILY
Status: DISCONTINUED | OUTPATIENT
Start: 2020-01-01 | End: 2020-01-01 | Stop reason: HOSPADM

## 2020-01-01 RX ORDER — HYDROCODONE BITARTRATE AND ACETAMINOPHEN 5; 325 MG/1; MG/1
2 TABLET ORAL EVERY 4 HOURS PRN
Status: DISCONTINUED | OUTPATIENT
Start: 2020-01-01 | End: 2020-01-01 | Stop reason: HOSPADM

## 2020-01-01 RX ORDER — MORPHINE SULFATE 4 MG/ML
4 INJECTION, SOLUTION INTRAMUSCULAR; INTRAVENOUS ONCE
Status: COMPLETED | OUTPATIENT
Start: 2020-01-01 | End: 2020-01-01

## 2020-01-01 RX ORDER — CEPHALEXIN 500 MG/1
500 CAPSULE ORAL 4 TIMES DAILY
Qty: 28 CAPSULE | Refills: 0 | Status: SHIPPED | OUTPATIENT
Start: 2020-01-01 | End: 2020-01-01

## 2020-01-01 RX ORDER — ROCURONIUM BROMIDE 10 MG/ML
INJECTION, SOLUTION INTRAVENOUS PRN
Status: DISCONTINUED | OUTPATIENT
Start: 2020-01-01 | End: 2020-01-01 | Stop reason: SDUPTHER

## 2020-01-01 RX ORDER — SODIUM CHLORIDE, SODIUM LACTATE, POTASSIUM CHLORIDE, CALCIUM CHLORIDE 600; 310; 30; 20 MG/100ML; MG/100ML; MG/100ML; MG/100ML
INJECTION, SOLUTION INTRAVENOUS CONTINUOUS
Status: DISCONTINUED | OUTPATIENT
Start: 2020-01-01 | End: 2020-01-01

## 2020-01-01 RX ORDER — HYDROCODONE BITARTRATE AND ACETAMINOPHEN 5; 325 MG/1; MG/1
1 TABLET ORAL EVERY 4 HOURS PRN
Status: DISCONTINUED | OUTPATIENT
Start: 2020-01-01 | End: 2020-01-01 | Stop reason: HOSPADM

## 2020-01-01 RX ORDER — PROPOFOL 10 MG/ML
40 INJECTION, EMULSION INTRAVENOUS ONCE
Status: COMPLETED | OUTPATIENT
Start: 2020-01-01 | End: 2020-01-01

## 2020-01-01 RX ORDER — ONDANSETRON 2 MG/ML
INJECTION INTRAMUSCULAR; INTRAVENOUS PRN
Status: DISCONTINUED | OUTPATIENT
Start: 2020-01-01 | End: 2020-01-01 | Stop reason: SDUPTHER

## 2020-01-01 RX ORDER — NEOSTIGMINE METHYLSULFATE 5 MG/5 ML
SYRINGE (ML) INTRAVENOUS PRN
Status: DISCONTINUED | OUTPATIENT
Start: 2020-01-01 | End: 2020-01-01 | Stop reason: SDUPTHER

## 2020-01-01 RX ORDER — FENTANYL CITRATE 50 UG/ML
INJECTION, SOLUTION INTRAMUSCULAR; INTRAVENOUS
Status: COMPLETED
Start: 2020-01-01 | End: 2020-01-01

## 2020-01-01 RX ORDER — POTASSIUM CHLORIDE 20 MEQ/1
40 TABLET, EXTENDED RELEASE ORAL PRN
Status: DISCONTINUED | OUTPATIENT
Start: 2020-01-01 | End: 2020-01-01 | Stop reason: HOSPADM

## 2020-01-01 RX ORDER — DOXYCYCLINE HYCLATE 50 MG/1
324 CAPSULE, GELATIN COATED ORAL
Status: DISCONTINUED | OUTPATIENT
Start: 2020-01-01 | End: 2020-01-01 | Stop reason: HOSPADM

## 2020-01-01 RX ORDER — GLYCOPYRROLATE 1 MG/5 ML
SYRINGE (ML) INTRAVENOUS PRN
Status: DISCONTINUED | OUTPATIENT
Start: 2020-01-01 | End: 2020-01-01 | Stop reason: SDUPTHER

## 2020-01-01 RX ORDER — ACETAMINOPHEN 650 MG/1
650 SUPPOSITORY RECTAL EVERY 6 HOURS PRN
Status: DISCONTINUED | OUTPATIENT
Start: 2020-01-01 | End: 2020-01-01

## 2020-01-01 RX ORDER — CLOPIDOGREL BISULFATE 75 MG/1
75 TABLET ORAL DAILY
Status: DISCONTINUED | OUTPATIENT
Start: 2020-01-01 | End: 2020-01-01 | Stop reason: HOSPADM

## 2020-01-01 RX ORDER — MIDODRINE HYDROCHLORIDE 5 MG/1
5 TABLET ORAL 3 TIMES DAILY
Status: DISCONTINUED | OUTPATIENT
Start: 2020-01-01 | End: 2020-01-01 | Stop reason: HOSPADM

## 2020-01-01 RX ORDER — POTASSIUM CHLORIDE 7.45 MG/ML
10 INJECTION INTRAVENOUS PRN
Status: DISCONTINUED | OUTPATIENT
Start: 2020-01-01 | End: 2020-01-01 | Stop reason: HOSPADM

## 2020-01-01 RX ORDER — ACETAMINOPHEN 325 MG/1
650 TABLET ORAL EVERY 6 HOURS PRN
Status: DISCONTINUED | OUTPATIENT
Start: 2020-01-01 | End: 2020-01-01 | Stop reason: HOSPADM

## 2020-01-01 RX ORDER — PROMETHAZINE HYDROCHLORIDE 25 MG/1
12.5 TABLET ORAL EVERY 6 HOURS PRN
Status: DISCONTINUED | OUTPATIENT
Start: 2020-01-01 | End: 2020-01-01 | Stop reason: HOSPADM

## 2020-01-01 RX ORDER — ACETAMINOPHEN 325 MG/1
650 TABLET ORAL EVERY 6 HOURS PRN
Status: DISCONTINUED | OUTPATIENT
Start: 2020-01-01 | End: 2020-01-01

## 2020-01-01 RX ORDER — HYDROCODONE BITARTRATE AND ACETAMINOPHEN 5; 325 MG/1; MG/1
1 TABLET ORAL EVERY 6 HOURS PRN
Qty: 12 TABLET | Refills: 0 | Status: SHIPPED | OUTPATIENT
Start: 2020-01-01 | End: 2020-01-01

## 2020-01-01 RX ORDER — DEXAMETHASONE SODIUM PHOSPHATE 10 MG/ML
INJECTION INTRAMUSCULAR; INTRAVENOUS PRN
Status: DISCONTINUED | OUTPATIENT
Start: 2020-01-01 | End: 2020-01-01 | Stop reason: SDUPTHER

## 2020-01-01 RX ORDER — LIDOCAINE HYDROCHLORIDE 10 MG/ML
INJECTION, SOLUTION EPIDURAL; INFILTRATION; INTRACAUDAL; PERINEURAL PRN
Status: DISCONTINUED | OUTPATIENT
Start: 2020-01-01 | End: 2020-01-01 | Stop reason: SDUPTHER

## 2020-01-01 RX ORDER — MORPHINE SULFATE 2 MG/ML
2 INJECTION, SOLUTION INTRAMUSCULAR; INTRAVENOUS
Status: DISCONTINUED | OUTPATIENT
Start: 2020-01-01 | End: 2020-01-01 | Stop reason: HOSPADM

## 2020-01-01 RX ORDER — PROPOFOL 10 MG/ML
1 INJECTION, EMULSION INTRAVENOUS ONCE
Status: COMPLETED | OUTPATIENT
Start: 2020-01-01 | End: 2020-01-01

## 2020-01-01 RX ORDER — CEFAZOLIN SODIUM 1 G/3ML
INJECTION, POWDER, FOR SOLUTION INTRAMUSCULAR; INTRAVENOUS PRN
Status: DISCONTINUED | OUTPATIENT
Start: 2020-01-01 | End: 2020-01-01 | Stop reason: SDUPTHER

## 2020-01-01 RX ORDER — ATORVASTATIN CALCIUM 10 MG/1
10 TABLET, FILM COATED ORAL DAILY
Status: DISCONTINUED | OUTPATIENT
Start: 2020-01-01 | End: 2020-01-01 | Stop reason: HOSPADM

## 2020-01-01 RX ORDER — ONDANSETRON 2 MG/ML
4 INJECTION INTRAMUSCULAR; INTRAVENOUS EVERY 6 HOURS PRN
Status: DISCONTINUED | OUTPATIENT
Start: 2020-01-01 | End: 2020-01-01 | Stop reason: HOSPADM

## 2020-01-01 RX ORDER — PROPOFOL 10 MG/ML
0.5 INJECTION, EMULSION INTRAVENOUS ONCE
Status: COMPLETED | OUTPATIENT
Start: 2020-01-01 | End: 2020-01-01

## 2020-01-01 RX ORDER — CEPHALEXIN 500 MG/1
500 CAPSULE ORAL 4 TIMES DAILY
Qty: 56 CAPSULE | Refills: 0 | Status: SHIPPED | OUTPATIENT
Start: 2020-01-01 | End: 2020-01-01

## 2020-01-01 RX ORDER — FENTANYL CITRATE 50 UG/ML
25 INJECTION, SOLUTION INTRAMUSCULAR; INTRAVENOUS ONCE
Status: COMPLETED | OUTPATIENT
Start: 2020-01-01 | End: 2020-01-01

## 2020-01-01 RX ORDER — SODIUM CHLORIDE 0.9 % (FLUSH) 0.9 %
10 SYRINGE (ML) INJECTION PRN
Status: DISCONTINUED | OUTPATIENT
Start: 2020-01-01 | End: 2020-01-01 | Stop reason: HOSPADM

## 2020-01-01 RX ORDER — MAGNESIUM SULFATE 1 G/100ML
1 INJECTION INTRAVENOUS PRN
Status: DISCONTINUED | OUTPATIENT
Start: 2020-01-01 | End: 2020-01-01 | Stop reason: HOSPADM

## 2020-01-01 RX ORDER — SODIUM CHLORIDE 0.9 % (FLUSH) 0.9 %
10 SYRINGE (ML) INJECTION EVERY 12 HOURS SCHEDULED
Status: DISCONTINUED | OUTPATIENT
Start: 2020-01-01 | End: 2020-01-01 | Stop reason: HOSPADM

## 2020-01-01 RX ORDER — DIPHENHYDRAMINE HYDROCHLORIDE 50 MG/ML
25 INJECTION INTRAMUSCULAR; INTRAVENOUS ONCE
Status: COMPLETED | OUTPATIENT
Start: 2020-01-01 | End: 2020-01-01

## 2020-01-01 RX ORDER — PROPOFOL 10 MG/ML
INJECTION, EMULSION INTRAVENOUS PRN
Status: DISCONTINUED | OUTPATIENT
Start: 2020-01-01 | End: 2020-01-01 | Stop reason: SDUPTHER

## 2020-01-01 RX ORDER — FENTANYL CITRATE 50 UG/ML
25 INJECTION, SOLUTION INTRAMUSCULAR; INTRAVENOUS EVERY 5 MIN PRN
Status: DISCONTINUED | OUTPATIENT
Start: 2020-01-01 | End: 2020-01-01 | Stop reason: HOSPADM

## 2020-01-01 RX ORDER — LEVOTHYROXINE SODIUM 0.1 MG/1
200 TABLET ORAL DAILY
Status: DISCONTINUED | OUTPATIENT
Start: 2020-01-01 | End: 2020-01-01 | Stop reason: HOSPADM

## 2020-01-01 RX ORDER — CEFPODOXIME PROXETIL 200 MG/1
200 TABLET, FILM COATED ORAL 2 TIMES DAILY
Qty: 20 TABLET | Refills: 0 | Status: SHIPPED | OUTPATIENT
Start: 2020-01-01 | End: 2020-01-01

## 2020-01-01 RX ORDER — PROPOFOL 10 MG/ML
INJECTION, EMULSION INTRAVENOUS DAILY PRN
Status: COMPLETED | OUTPATIENT
Start: 2020-01-01 | End: 2020-01-01

## 2020-01-01 RX ORDER — FENTANYL CITRATE 50 UG/ML
50 INJECTION, SOLUTION INTRAMUSCULAR; INTRAVENOUS ONCE
Status: COMPLETED | OUTPATIENT
Start: 2020-01-01 | End: 2020-01-01

## 2020-01-01 RX ORDER — ACETAMINOPHEN 650 MG/1
650 SUPPOSITORY RECTAL EVERY 6 HOURS PRN
Status: DISCONTINUED | OUTPATIENT
Start: 2020-01-01 | End: 2020-01-01 | Stop reason: HOSPADM

## 2020-01-01 RX ORDER — METOPROLOL SUCCINATE 50 MG/1
50 TABLET, EXTENDED RELEASE ORAL DAILY
Status: DISCONTINUED | OUTPATIENT
Start: 2020-01-01 | End: 2020-01-01 | Stop reason: HOSPADM

## 2020-01-01 RX ORDER — FENTANYL CITRATE 50 UG/ML
50 INJECTION, SOLUTION INTRAMUSCULAR; INTRAVENOUS EVERY 5 MIN PRN
Status: DISCONTINUED | OUTPATIENT
Start: 2020-01-01 | End: 2020-01-01 | Stop reason: HOSPADM

## 2020-01-01 RX ORDER — POLYETHYLENE GLYCOL 3350 17 G/17G
17 POWDER, FOR SOLUTION ORAL DAILY PRN
Status: DISCONTINUED | OUTPATIENT
Start: 2020-01-01 | End: 2020-01-01 | Stop reason: HOSPADM

## 2020-01-01 RX ORDER — CEPHALEXIN 500 MG/1
500 CAPSULE ORAL 4 TIMES DAILY
Qty: 40 CAPSULE | Refills: 0 | Status: SHIPPED | OUTPATIENT
Start: 2020-01-01 | End: 2020-01-01

## 2020-01-01 RX ORDER — MORPHINE SULFATE 4 MG/ML
4 INJECTION, SOLUTION INTRAMUSCULAR; INTRAVENOUS
Status: DISCONTINUED | OUTPATIENT
Start: 2020-01-01 | End: 2020-01-01 | Stop reason: HOSPADM

## 2020-01-01 RX ORDER — FLUTICASONE PROPIONATE 50 MCG
SPRAY, SUSPENSION (ML) NASAL
Qty: 1 BOTTLE | Refills: 3 | Status: SHIPPED | OUTPATIENT
Start: 2020-01-01

## 2020-01-01 RX ORDER — NICOTINE 21 MG/24HR
1 PATCH, TRANSDERMAL 24 HOURS TRANSDERMAL DAILY PRN
Status: DISCONTINUED | OUTPATIENT
Start: 2020-01-01 | End: 2020-01-01 | Stop reason: HOSPADM

## 2020-01-01 RX ORDER — CITALOPRAM 20 MG/1
20 TABLET ORAL NIGHTLY
Status: DISCONTINUED | OUTPATIENT
Start: 2020-01-01 | End: 2020-01-01 | Stop reason: HOSPADM

## 2020-01-01 RX ORDER — FLUTICASONE PROPIONATE 50 MCG
2 SPRAY, SUSPENSION (ML) NASAL DAILY
Status: DISCONTINUED | OUTPATIENT
Start: 2020-01-01 | End: 2020-01-01 | Stop reason: HOSPADM

## 2020-01-01 RX ADMIN — ROCURONIUM BROMIDE 20 MG: 10 INJECTION INTRAVENOUS at 07:40

## 2020-01-01 RX ADMIN — PROPOFOL 1452 MCG: 10 INJECTION, EMULSION INTRAVENOUS at 20:24

## 2020-01-01 RX ADMIN — PROPOFOL 2904 MCG: 10 INJECTION, EMULSION INTRAVENOUS at 20:19

## 2020-01-01 RX ADMIN — Medication 25 MCG: at 20:41

## 2020-01-01 RX ADMIN — SODIUM CHLORIDE, PRESERVATIVE FREE 10 ML: 5 INJECTION INTRAVENOUS at 22:19

## 2020-01-01 RX ADMIN — PROPOFOL 1452 MCG: 10 INJECTION, EMULSION INTRAVENOUS at 20:32

## 2020-01-01 RX ADMIN — SODIUM CHLORIDE, POTASSIUM CHLORIDE, SODIUM LACTATE AND CALCIUM CHLORIDE: 600; 310; 30; 20 INJECTION, SOLUTION INTRAVENOUS at 07:15

## 2020-01-01 RX ADMIN — PROPOFOL 36 MG: 10 INJECTION, EMULSION INTRAVENOUS at 21:10

## 2020-01-01 RX ADMIN — FENTANYL CITRATE 25 MCG: 50 INJECTION INTRAMUSCULAR; INTRAVENOUS at 08:53

## 2020-01-01 RX ADMIN — PROPOFOL 1452 MCG: 10 INJECTION, EMULSION INTRAVENOUS at 20:46

## 2020-01-01 RX ADMIN — ROCURONIUM BROMIDE 30 MG: 10 INJECTION INTRAVENOUS at 07:33

## 2020-01-01 RX ADMIN — PROPOFOL 20 MG: 10 INJECTION, EMULSION INTRAVENOUS at 17:35

## 2020-01-01 RX ADMIN — METOPROLOL SUCCINATE 50 MG: 50 TABLET, EXTENDED RELEASE ORAL at 08:09

## 2020-01-01 RX ADMIN — FENTANYL CITRATE 50 MCG: 50 INJECTION, SOLUTION INTRAMUSCULAR; INTRAVENOUS at 19:25

## 2020-01-01 RX ADMIN — PROPOFOL 1452 MCG: 10 INJECTION, EMULSION INTRAVENOUS at 20:33

## 2020-01-01 RX ADMIN — CEFAZOLIN 2000 MG: 1 INJECTION, POWDER, FOR SOLUTION INTRAMUSCULAR; INTRAVENOUS at 07:40

## 2020-01-01 RX ADMIN — FENTANYL CITRATE 25 MCG: 50 INJECTION, SOLUTION INTRAMUSCULAR; INTRAVENOUS at 19:22

## 2020-01-01 RX ADMIN — Medication 0.6 MG: at 08:21

## 2020-01-01 RX ADMIN — PROPOFOL 35 MG: 10 INJECTION, EMULSION INTRAVENOUS at 17:34

## 2020-01-01 RX ADMIN — PHENYLEPHRINE HYDROCHLORIDE 100 MCG: 10 INJECTION INTRAVENOUS at 07:33

## 2020-01-01 RX ADMIN — SODIUM CHLORIDE, PRESERVATIVE FREE 10 ML: 5 INJECTION INTRAVENOUS at 08:41

## 2020-01-01 RX ADMIN — PROPOFOL 40 MG: 10 INJECTION, EMULSION INTRAVENOUS at 18:12

## 2020-01-01 RX ADMIN — FENTANYL CITRATE 25 MCG: 50 INJECTION INTRAMUSCULAR; INTRAVENOUS at 09:15

## 2020-01-01 RX ADMIN — PROPOFOL 80 MG: 10 INJECTION, EMULSION INTRAVENOUS at 07:33

## 2020-01-01 RX ADMIN — FLUTICASONE PROPIONATE 2 SPRAY: 50 SPRAY, METERED NASAL at 10:47

## 2020-01-01 RX ADMIN — LIDOCAINE HYDROCHLORIDE 50 MG: 10 INJECTION, SOLUTION EPIDURAL; INFILTRATION; INTRACAUDAL; PERINEURAL at 07:33

## 2020-01-01 RX ADMIN — FENTANYL CITRATE 25 MCG: 50 INJECTION INTRAMUSCULAR; INTRAVENOUS at 19:21

## 2020-01-01 RX ADMIN — PROPOFOL 1452 MCG: 10 INJECTION, EMULSION INTRAVENOUS at 20:23

## 2020-01-01 RX ADMIN — DEXTROSE MONOHYDRATE 2 G: 50 INJECTION, SOLUTION INTRAVENOUS at 14:12

## 2020-01-01 RX ADMIN — MORPHINE SULFATE 4 MG: 4 INJECTION, SOLUTION INTRAMUSCULAR; INTRAVENOUS at 12:56

## 2020-01-01 RX ADMIN — PROPOFOL 1452 MCG: 10 INJECTION, EMULSION INTRAVENOUS at 20:28

## 2020-01-01 RX ADMIN — Medication 4 MG: at 08:21

## 2020-01-01 RX ADMIN — DEXAMETHASONE SODIUM PHOSPHATE 4 MG: 10 INJECTION INTRAMUSCULAR; INTRAVENOUS at 07:43

## 2020-01-01 RX ADMIN — DIPHENHYDRAMINE HYDROCHLORIDE 25 MG: 50 INJECTION, SOLUTION INTRAMUSCULAR; INTRAVENOUS at 05:17

## 2020-01-01 RX ADMIN — FENTANYL CITRATE 25 MCG: 50 INJECTION INTRAMUSCULAR; INTRAVENOUS at 07:33

## 2020-01-01 RX ADMIN — PROPOFOL 726 MCG: 10 INJECTION, EMULSION INTRAVENOUS at 20:53

## 2020-01-01 RX ADMIN — PROPOFOL 40 MG: 10 INJECTION, EMULSION INTRAVENOUS at 14:05

## 2020-01-01 RX ADMIN — MORPHINE SULFATE 2 MG: 2 INJECTION, SOLUTION INTRAMUSCULAR; INTRAVENOUS at 14:13

## 2020-01-01 RX ADMIN — ONDANSETRON 4 MG: 2 INJECTION, SOLUTION INTRAMUSCULAR; INTRAVENOUS at 08:18

## 2020-01-01 RX ADMIN — PROPOFOL 1452 MCG: 10 INJECTION, EMULSION INTRAVENOUS at 20:45

## 2020-01-01 RX ADMIN — PROPOFOL 6897 MCG: 10 INJECTION, EMULSION INTRAVENOUS at 20:26

## 2020-01-01 ASSESSMENT — PAIN SCALES - PAIN ASSESSMENT IN ADVANCED DEMENTIA (PAINAD)
FACIALEXPRESSION: 0
TOTALSCORE: 0
BODYLANGUAGE: 0
CONSOLABILITY: 0
CONSOLABILITY: 0
BREATHING: 0
NEGVOCALIZATION: 0
BODYLANGUAGE: 0
FACIALEXPRESSION: 0
NEGVOCALIZATION: 0
FACIALEXPRESSION: 0
NEGVOCALIZATION: 0
NEGVOCALIZATION: 0
CONSOLABILITY: 0
BODYLANGUAGE: 0
CONSOLABILITY: 0
FACIALEXPRESSION: 0
TOTALSCORE: 0
FACIALEXPRESSION: 0
CONSOLABILITY: 0
FACIALEXPRESSION: 0
FACIALEXPRESSION: 0
BREATHING: 0
FACIALEXPRESSION: 0
CONSOLABILITY: 0
BREATHING: 0
BODYLANGUAGE: 0
FACIALEXPRESSION: 0
TOTALSCORE: 0
NEGVOCALIZATION: 0
CONSOLABILITY: 0
FACIALEXPRESSION: 0
BODYLANGUAGE: 0
CONSOLABILITY: 0
TOTALSCORE: 0
FACIALEXPRESSION: 0
BREATHING: 0
BODYLANGUAGE: 0
FACIALEXPRESSION: 0
BREATHING: 0
NEGVOCALIZATION: 0
TOTALSCORE: 0
BODYLANGUAGE: 0
BODYLANGUAGE: 0
TOTALSCORE: 0
BREATHING: 0
BODYLANGUAGE: 0
TOTALSCORE: 0
BREATHING: 0
TOTALSCORE: 0
BODYLANGUAGE: 0
TOTALSCORE: 0
BODYLANGUAGE: 0
BREATHING: 0
BREATHING: 0
BODYLANGUAGE: 0
NEGVOCALIZATION: 0
TOTALSCORE: 0
NEGVOCALIZATION: 0
BODYLANGUAGE: 0
BREATHING: 0
CONSOLABILITY: 0
CONSOLABILITY: 0
BODYLANGUAGE: 0
TOTALSCORE: 0
BREATHING: 0
CONSOLABILITY: 0
TOTALSCORE: 0
NEGVOCALIZATION: 0
FACIALEXPRESSION: 0
CONSOLABILITY: 0
CONSOLABILITY: 0
TOTALSCORE: 0
BODYLANGUAGE: 0
BODYLANGUAGE: 0
TOTALSCORE: 0
BREATHING: 0
NEGVOCALIZATION: 0
BREATHING: 0
BREATHING: 0
CONSOLABILITY: 0
NEGVOCALIZATION: 0
CONSOLABILITY: 0
NEGVOCALIZATION: 0
BODYLANGUAGE: 0
CONSOLABILITY: 0
BREATHING: 0
CONSOLABILITY: 0
BODYLANGUAGE: 0
FACIALEXPRESSION: 0
BREATHING: 0
TOTALSCORE: 0
BREATHING: 0
FACIALEXPRESSION: 0
TOTALSCORE: 0
TOTALSCORE: 0
BODYLANGUAGE: 0
NEGVOCALIZATION: 0
CONSOLABILITY: 0
CONSOLABILITY: 0
TOTALSCORE: 0
BREATHING: 0
CONSOLABILITY: 0
NEGVOCALIZATION: 0
NEGVOCALIZATION: 0
FACIALEXPRESSION: 0
FACIALEXPRESSION: 0
NEGVOCALIZATION: 0
BREATHING: 0
FACIALEXPRESSION: 0
BODYLANGUAGE: 0
CONSOLABILITY: 0
TOTALSCORE: 0
FACIALEXPRESSION: 0
CONSOLABILITY: 0
NEGVOCALIZATION: 0
CONSOLABILITY: 0
BREATHING: 0
CONSOLABILITY: 0
NEGVOCALIZATION: 0
BODYLANGUAGE: 0
NEGVOCALIZATION: 0
CONSOLABILITY: 0
BODYLANGUAGE: 0
TOTALSCORE: 0
BODYLANGUAGE: 0
TOTALSCORE: 0
CONSOLABILITY: 0
NEGVOCALIZATION: 0
NEGVOCALIZATION: 0
BODYLANGUAGE: 0
BREATHING: 0
BREATHING: 0
NEGVOCALIZATION: 0
BREATHING: 0
FACIALEXPRESSION: 0
BREATHING: 0
BREATHING: 0
TOTALSCORE: 0
CONSOLABILITY: 0
NEGVOCALIZATION: 0
FACIALEXPRESSION: 0
BODYLANGUAGE: 0
NEGVOCALIZATION: 0
FACIALEXPRESSION: 0
BREATHING: 0
TOTALSCORE: 0
FACIALEXPRESSION: 0
CONSOLABILITY: 0
BREATHING: 0
FACIALEXPRESSION: 0
TOTALSCORE: 0
FACIALEXPRESSION: 0
CONSOLABILITY: 0
BODYLANGUAGE: 0
FACIALEXPRESSION: 0
TOTALSCORE: 0
BODYLANGUAGE: 0
BREATHING: 0
BODYLANGUAGE: 0
BODYLANGUAGE: 0
TOTALSCORE: 0
TOTALSCORE: 0
FACIALEXPRESSION: 0
TOTALSCORE: 0
NEGVOCALIZATION: 0
BODYLANGUAGE: 0
TOTALSCORE: 0
FACIALEXPRESSION: 0
CONSOLABILITY: 0
FACIALEXPRESSION: 0
BREATHING: 0
NEGVOCALIZATION: 0
FACIALEXPRESSION: 0
NEGVOCALIZATION: 0
BREATHING: 0
NEGVOCALIZATION: 0
BODYLANGUAGE: 0
BREATHING: 0
TOTALSCORE: 0
NEGVOCALIZATION: 0
BREATHING: 0
BODYLANGUAGE: 0
TOTALSCORE: 0
TOTALSCORE: 0
NEGVOCALIZATION: 0

## 2020-01-01 ASSESSMENT — PAIN SCALES - GENERAL
PAINLEVEL_OUTOF10: 0
PAINLEVEL_OUTOF10: 6
PAINLEVEL_OUTOF10: 7
PAINLEVEL_OUTOF10: 10
PAINLEVEL_OUTOF10: 5
PAINLEVEL_OUTOF10: 0
PAINLEVEL_OUTOF10: 5
PAINLEVEL_OUTOF10: 2
PAINLEVEL_OUTOF10: 2
PAINLEVEL_OUTOF10: 5
PAINLEVEL_OUTOF10: 0
PAINLEVEL_OUTOF10: 5

## 2020-01-01 ASSESSMENT — ENCOUNTER SYMPTOMS
SHORTNESS OF BREATH: 0
CHEST TIGHTNESS: 0
ABDOMINAL PAIN: 0
SHORTNESS OF BREATH: 0
BLOOD IN STOOL: 0
ABDOMINAL PAIN: 0
COUGH: 0
ABDOMINAL DISTENTION: 0

## 2020-01-01 ASSESSMENT — PULMONARY FUNCTION TESTS
PIF_VALUE: 1
PIF_VALUE: 2
PIF_VALUE: 17
PIF_VALUE: 4
PIF_VALUE: 17
PIF_VALUE: 17
PIF_VALUE: 18
PIF_VALUE: 18
PIF_VALUE: 1
PIF_VALUE: 17
PIF_VALUE: 2
PIF_VALUE: 17
PIF_VALUE: 17
PIF_VALUE: 1
PIF_VALUE: 23
PIF_VALUE: 17
PIF_VALUE: 18
PIF_VALUE: 17
PIF_VALUE: 13
PIF_VALUE: 18
PIF_VALUE: 0
PIF_VALUE: 17
PIF_VALUE: 1
PIF_VALUE: 3
PIF_VALUE: 2
PIF_VALUE: 18
PIF_VALUE: 18
PIF_VALUE: 16
PIF_VALUE: 4
PIF_VALUE: 3
PIF_VALUE: 18
PIF_VALUE: 18
PIF_VALUE: 15
PIF_VALUE: 1
PIF_VALUE: 17
PIF_VALUE: 3
PIF_VALUE: 17
PIF_VALUE: 20
PIF_VALUE: 16
PIF_VALUE: 17
PIF_VALUE: 17
PIF_VALUE: 1
PIF_VALUE: 17
PIF_VALUE: 20
PIF_VALUE: 17
PIF_VALUE: 18
PIF_VALUE: 16
PIF_VALUE: 4
PIF_VALUE: 17
PIF_VALUE: 3
PIF_VALUE: 18
PIF_VALUE: 2
PIF_VALUE: 0
PIF_VALUE: 0
PIF_VALUE: 18
PIF_VALUE: 17
PIF_VALUE: 1
PIF_VALUE: 17
PIF_VALUE: 4
PIF_VALUE: 18
PIF_VALUE: 17

## 2020-01-01 ASSESSMENT — PAIN SCALES - WONG BAKER
WONGBAKER_NUMERICALRESPONSE: 0
WONGBAKER_NUMERICALRESPONSE: 6
WONGBAKER_NUMERICALRESPONSE: 0
WONGBAKER_NUMERICALRESPONSE: 8
WONGBAKER_NUMERICALRESPONSE: 0

## 2020-01-01 ASSESSMENT — PAIN - FUNCTIONAL ASSESSMENT: PAIN_FUNCTIONAL_ASSESSMENT: FLACC

## 2020-01-07 NOTE — TELEPHONE ENCOUNTER
The patient's nephew called to let you know that he had to take his aunt to [facility] ER yesterday where she was treated for dehydration. He called and spoke to Dr Joyce Gallardo office today where they told him to hold her potassium supplement and Laxix for 2 weeks since her potassium was 5.2 at ER. Potassium level will be rechecked in 2 weeks. She will be weighed daily and if she is over 185 lbs, then Lasix 20 mg will be administered every other day. If her systolic BP drops below 748 then she will receive Proamatine 5 mg TID. She also had a CT scan which showed some sinusitis but ER doctor will defer to you for possible treatment. The patient has a follow up appointment to see you on 01/09/20.

## 2020-01-08 NOTE — PROGRESS NOTES
[facility]  Return Patient    Chief Complaint   Patient presents with    Nail Problem     nail trim/last saw Sammy Ip 11/8/2019       Subjective: This B Jeanne López comes to clinic for foot and nail care. Pt currently has complaint of thickened, painful, elongated nails that he/she cannot manage by themselves. Pt. Relates pain to nails with shoe gear.   Pt's primary care physician is Eloisa Wayne MD.  Past Medical History:   Diagnosis Date    Atrial fibrillation [facility])     Bradycardia     CHF (congestive heart failure) (HCC)     Chronic kidney disease     CKD (chronic kidney disease) stage 4, GFR 15-29 ml/min ([facility] 4/13/2018    COPD (chronic obstructive pulmonary disease) ([facility]     Hyperlipidemia     Hypertension     Hypothyroidism     Short-term memory loss     Swelling of both lower extremities 09/15/1993       Allergies   Allergen Reactions    Asa [Aspirin]     Feldene [Piroxicam]     Levaquin [Levofloxacin] Itching     Current Outpatient Medications on File Prior to Visit   Medication Sig Dispense Refill    midodrine (PROAMATINE) 5 MG tablet Take 1 tablet by mouth 3 times daily Hold for a systolic BP greater than 090. 270 tablet 3    fluticasone (FLONASE) 50 MCG/ACT nasal spray 2 sprays into each nostril daily 1 Bottle 3    levothyroxine (SYNTHROID) 200 MCG tablet Take 200 mcg by mouth Daily      Magnesium 400 MG CAPS Take by mouth 2 times daily      metoprolol succinate (TOPROL XL) 50 MG extended release tablet Take 50 mg by mouth daily      pantoprazole (PROTONIX) 40 MG tablet Take 40 mg by mouth daily      clopidogrel (PLAVIX) 75 MG tablet Take 75 mg by mouth daily      ferrous gluconate (FERGON) 324 (38 FE) MG tablet Take 324 mg by mouth daily (with breakfast)      Cholecalciferol (VITAMIN D-3) 1000 UNITS CAPS Take 1,000 mg by mouth nightly       lovastatin (MEVACOR) 40 MG tablet Take 40 mg by mouth Daily with supper       citalopram (CELEXA) 20 MG tablet Take 20 mg by mouth nightly       cefpodoxime (VANTIN) 200 MG tablet Take 1 tablet by mouth 2 times daily for 10 days (Patient not taking: Reported on 1/8/2020) 20 tablet 0    furosemide (LASIX) 40 MG tablet Take 40 mg by mouth 2 times daily      potassium chloride (KLOR-CON M) 20 MEQ extended release tablet Take 1 tablet by mouth daily 90 tablet 3    mupirocin (BACTROBAN) 2 % cream Apply 3 times daily. (Patient not taking: Reported on 12/10/2019) 15 g 0     No current facility-administered medications on file prior to visit. Review of Systems  Objective:  General: AAO x 3 in NAD. Derm  Toenail Description  Sites of Onychomycosis Involvement (Check affected area)  [x] [x] [x] [x] [x] [x] [x] [x] [x] [x]  5 4 3 2 1 1 2 3 4 5                          Right                                        Left    Thickness  [x] [x] [x] [x] [x] [x] [x] [x] [x] [x]  5 4 3 2 1 1 2 3 4 5                         Right                                        Left    Dystrophic Changes   [x] [x] [x] [x] [x] [x] [x] [x] [x] [x]  5 4 3 2 1 1 2 3 4 5                         Right                                        Left    Color  [x] [x] [x] [x] [x] [x] [x] [x] [x] [x]  5 4 3 2 1 1 2 3 4 5                          Right                                        Left    Incurvation/Ingrowin   [] [] [] [] [] [] [] [] [] []  5 4 3 2 1 1 2 3 4 5                         Right                                        Left    Inflammation/Pain   [x] [x] [x] [x] [x] [x] [x] [x] [x] [x]  5 4 3 2 1 1 2 3 4 5                         Right                                        Left       Nails are painful 1-10 with direct palpation. Vascular:  DP/PT pulses palpable 2/4, Bilateral.    CFT <3 seconds to digits 1-5, Bilateral .   Hair growth absent to level of digits, Bilateral.    Neurological:  Sensation present to light touch to level of digits, Bilateral.    Assessment:  80 y.o. female with:   1. Onychomycosis    2.  Pain in toes of both

## 2020-01-09 NOTE — PROGRESS NOTES
Subjective:      Patient ID: Vel Glasgow is a 80 y.o. female. HPI  Chronic Disease Visit Information    BP Readings from Last 3 Encounters:   12/10/19 104/62   11/08/19 (!) 126/56   08/19/19 130/60          LDL Calculated (mg/dL)   Date Value   11/30/2019 67     HDL (mg/dL)   Date Value   11/30/2019 57     BUN (mg/dL)   Date Value   11/30/2019 27     CREATININE (no units)   Date Value   11/30/2019 1.61     Glucose (mg/dL)   Date Value   11/30/2019 95            Have you changed or started any medications since your last visit including any over-the-counter medicines, vitamins, or herbal medicines? no   Are you having any side effects from any of your medications? -  no  Have you stopped taking any of your medications? Is so, why? -  no    Have you seen any other physician or provider since your last visit? Yes - Records Obtained Dr. Miguel Luke, Dr. Lobo Last Nephrology  Have you had any other diagnostic tests since your last visit? No  Have you been seen in the emergency room and/or had an admission to a hospital since we last saw you? Yes - Records Obtained  Have you had your annual diabetic retinal (eye) exam? No  Have you had your routine dental cleaning in the past 6 months? no    Have you activated your [employer] account? If not, what are your barriers?  Yes     Patient Care Team:  Peter Lozano MD as PCP - General (Family Medicine)  Peter Lozano MD as PCP - [facility]  Briana Mitchell MD as Surgeon (Cardiology)  Clem Levin DPM as Consulting Physician (Podiatry)  Jackie Gramajo (Endocrinology)  Nishi Alvarez MD as Consulting Physician (Gastroenterology)  Bessy Guardado RN as Ambulatory Care Manager         Medical History Review  Past Medical, Family, and Social History reviewed and does not contribute to the patient presenting condition    Health Maintenance   Topic Date Due    DTaP/Tdap/Td vaccine (1 - Tdap) 09/30/1931    Shingles Vaccine (1 of 2) 09/30/1970    TSH testing  10/21/2017    Annual Wellness Visit (AWV)  05/29/2019    Pneumococcal 65+ years Vaccine (2 of 2 - PPSV23) 07/12/2020    Lipid screen  11/30/2020    Potassium monitoring  11/30/2020    Creatinine monitoring  11/30/2020    Flu vaccine  Completed    Hepatitis B vaccine  Aged Out    HPV vaccine  Aged Out   Patient is a 22-year-old white female brought to the office by her nephew for follow-up of recent ER visit for dizziness, dehydration, hyperkalemia. Patient's Lasix and potassium supplement were discontinued and patient states that she is feeling much better today. She also received IV fluids in the ER. CT of head showed right maxillary sinusitis for which I prescribed Vantin and Flonase which patient has been taking. She states that she is taking and tolerating her current medication. She denies any chest pain, abdominal pain, shortness of breath, fever or chills. She states she has a good appetite. Review of Systems   Constitutional: Negative for chills and fever. HENT: Negative for congestion. Respiratory: Negative for chest tightness and shortness of breath. Cardiovascular: Negative for chest pain. Gastrointestinal: Negative for abdominal pain and blood in stool. Genitourinary: Negative for dysuria and hematuria. Skin: Negative for rash. Neurological: Positive for dizziness (  Resolved. ). Psychiatric/Behavioral: Negative for dysphoric mood. Objective:   Physical Exam  Vitals signs and nursing note reviewed. Constitutional:       General: She is not in acute distress. Appearance: She is well-developed. HENT:      Head: Normocephalic and atraumatic. Right Ear: Tympanic membrane, ear canal and external ear normal.      Left Ear: Tympanic membrane, ear canal and external ear normal.      Nose: Nose normal.      Mouth/Throat:      Mouth: Mucous membranes are moist.      Pharynx: Oropharynx is clear. Eyes:      General: No scleral icterus.         Right eye: normal. Treatment plan consists of No treatment change needed. Fall Risk 5/6/2019 2/19/2018   2 or more falls in past year? no no   Fall with injury in past year? no no     The patient does not have a history of falls. I did not - not indicated , complete a risk assessment for falls.  A plan of care for falls No Treatment plan indicated    Lab Results   Component Value Date    LDLCALC 67 11/30/2019    (goal LDL reduction with dx if diabetes is 50% LDL reduction)    PHQ Scores 5/6/2019 2/19/2018   PHQ2 Score 0 0   PHQ9 Score 0 0     Interpretation of Total Score Depression Severity: 1-4 = Minimal depression, 5-9 = Mild depression, 10-14 = Moderate depression, 15-19 = Moderately severe depression, 20-27 = Severe depression        Continue Vantin and Flonase as prescribed for her sinusitis  Take probiotics  Continue other routine medication  Repeat lab work ordered by patient's nephrologist  Follow-up with her nephrologist, cardiologist and endocrinologist as scheduled  Follow-up here in 4 months as scheduled

## 2020-06-04 NOTE — ED NOTES
Dr. Julius Black at bedside     Sukhdev Hodges, [facility]  06/04/20 [id number]

## 2020-06-04 NOTE — ED PROVIDER NOTES
Used   Substance Use Topics    Alcohol use: No     Alcohol/week: 0.0 standard drinks    Drug use: No     PHYSICAL EXAM     INITIAL VITALS: /67   Pulse 70   Temp 97.6 °F (36.4 °C) (Oral)   Resp 18   SpO2 97%    Physical Exam  Vitals signs and nursing note reviewed. Constitutional:       Appearance: She is cachectic. She is not toxic-appearing. HENT:      Head: Normocephalic and atraumatic. Nose: No congestion. Mouth/Throat:      Mouth: Mucous membranes are moist.   Eyes:      Conjunctiva/sclera: Conjunctivae normal.   Neck:      Musculoskeletal: Normal range of motion and neck supple. Cardiovascular:      Rate and Rhythm: Normal rate and regular rhythm. Heart sounds: No murmur. Pulmonary:      Effort: Pulmonary effort is normal.      Breath sounds: Normal breath sounds. Abdominal:      Palpations: Abdomen is soft. Tenderness: There is no abdominal tenderness. Musculoskeletal:         General: Signs of injury (left elbow deformity. 2+ radial pulse. no shoulder or wrist deformity.) present. Skin:     General: Skin is warm and dry. Capillary Refill: Capillary refill takes less than 2 seconds. Findings: No rash. Neurological:      General: No focal deficit present. Mental Status: She is alert and oriented to person, place, and time. MEDICAL DECISION MAKING:     Reviewed results. Elbow dislocation. Discussed with dpoa, verbal consent for procedure and sedation. Attempted reduction, unsuccessful. Discussed with ortho, presented, reduced per note. Otherwise, pt appears well, appropriate for discharge back to [facility]. Discussed results and plan with the pt. They expressed appropriate understanding. Pt given close follow up, supportive care instructions and strict return instructions at the bedside.          CRITICAL CARE:       PROCEDURES:    Ortho Injury  Date/Time: 6/5/2020 7:10 AM  Performed by: Diogenes Blackmon MD  Authorized by: Diogenes Blackmon,

## 2020-06-04 NOTE — ED NOTES
RN called pt's nephew Shivani Peña [employer] [facility]) for verbal consent for conscious sedation for elbow relocation.      Smith Woods RN  06/04/20 [id number]

## 2020-06-22 NOTE — ED NOTES
Spoke with Jagdeep Tomlinson at [facility], pts nurse.  Notified of Covid+ status     Denny Barnes RN  06/22/20 [id number]

## 2020-06-22 NOTE — ED NOTES
Report given to Debbie Hagan RN from [facility]. Report method in person   The following was reviewed with receiving RN:   Current vital signs:  /76   Pulse 70   Temp 97.6 °F (36.4 °C)   Resp 28   Ht 5' 7\" (1.702 m)   Wt 160 lb (72.6 kg)   SpO2 97%   BMI 25.06 kg/m²                MEWS Score: 0     Any medication or safety alerts were reviewed. Any pending diagnostics and notifications were also reviewed, as well as any safety concerns or issues, abnormal labs, abnormal imaging, and abnormal assessment findings. Questions were answered.             Preet Salcedo RN  06/22/20 8945

## 2020-06-22 NOTE — ED NOTES
Called back to [facility] to attempt to give report on this pt., spoke with Shakira, who did not know phone number to their COVID unit. I was transferred to another extention, Becca answered, she could not transfer me to the COVID unit. Gave her The ER number and she said she would have the nurse call me back.      Gage Bear, RN  06/22/20 3868

## 2020-06-22 NOTE — ED NOTES
Dr. Mj Melton placed splint to left elbow from left upper arm to left wrist.     Aura Olivo RN  06/22/20 7752

## 2020-06-22 NOTE — ED NOTES
2liters of oxygen taken off as pt keeps trying to remove. Oxygen saturation is 99% on room air.      Khushbu Ponce RN  06/22/20 4024

## 2020-06-22 NOTE — ED NOTES
Dr. Rick Ching to reduced left elbow. 4 x 4 dressings placed over skin tear to left outer elbow by Dr. Rick Ching.      Millie Valerio RN  06/22/20 1800

## 2020-06-22 NOTE — ED PROVIDER NOTES
[id number]  [phone]    In 1 week        DISCHARGE MEDICATIONS:  Discharge Medication List as of 6/22/2020  7:15 PM            Jayden Darnell MD  Attending Emergency Physician                      Jayden Darnell MD  06/22/20 3711

## 2020-06-23 NOTE — PROCEDURES
Cardiovascular: normal    COMPLICATIONS: none     Kash Kay DO  9:53 PM, 6/22/20    [address]  eMERGENCY dEPARTMENT eNCOUnter   Attending Attestation     I performed a history and physical examination of the patient and discussed management with the resident. I reviewed the residents note and agree with the documented findings and plan of care. Any areas of disagreement are noted on the chart. I was personally present for the key portions of any procedures. I have documented in the chart those procedures where I was not present during the key portions. I have personally reviewed all images and agree with the resident's interpretation. I have reviewed the emergency nurses triage note. I agree with the chief complaint, past medical history, past surgical history, allergies, medications, social and family history as documented unless otherwise noted below. Documentation of the HPI, Physical Exam and Medical Decision Making performed by medical students or scribes is based on my personal performance of the HPI, PE and MDM. For Phys Assistant/ Nurse Practitioner cases/documentation I have had a face to face evaluation of this patient and have completed at least one if not all key elements of the E/M (history, physical exam, and MDM). Additional findings are as noted.     Iván Acosta MD  Attending Emergency  Physician

## 2020-06-25 PROBLEM — S53.106A: Status: ACTIVE | Noted: 2020-01-01

## 2020-06-25 NOTE — ED PROVIDER NOTES
with large joint effusion. Probable coronoid fracture. No unexpected radiopaque foreign body. Generalized soft tissue swelling. Cross-sectional imaging could be obtained to assess for subtle fractures. Multiple sequential images document attempted reduction with the final image showing improved alignment and probable coronoid fracture. Xr Elbow Left (2 Views)    Result Date: 6/25/2020  EXAMINATION: 1 X-RAY VIEW OF THE LEFT ELBOW. 1 X-RAY VIEW OF THE LEFT ELBOW. 1 X-RAY VIEW OF THE LEFT ELBOW. 1 X-RAY VIEW OF THE LEFT ELBOW. 1 X-RAY VIEW OF THE LEFT ELBOW. 2 X-RAY VIEWS OF THE LEFT ELBOW. 2 X-RAY VIEWS OF THE LEFT ELBOW. 3 X-RAY VIEWS OF THE LEFT ELBOW. 6/25/2020 9:29 pm COMPARISON: 6/25/2020 HISTORY: Elbow dislocation with multiple attempted reductions. FINDINGS: Sequential images of attempted reduction of fracture dislocation of the elbow. Eight total attempts. The final postreduction image shows reduced joint with large joint effusion. Probable coronoid fracture. No unexpected radiopaque foreign body. Generalized soft tissue swelling. Cross-sectional imaging could be obtained to assess for subtle fractures. Multiple sequential images document attempted reduction with the final image showing improved alignment and probable coronoid fracture. Xr Elbow Left (2 Views)    Result Date: 6/25/2020  EXAMINATION: 1 X-RAY VIEW OF THE LEFT ELBOW. 1 X-RAY VIEW OF THE LEFT ELBOW. 1 X-RAY VIEW OF THE LEFT ELBOW. 1 X-RAY VIEW OF THE LEFT ELBOW. 1 X-RAY VIEW OF THE LEFT ELBOW. 2 X-RAY VIEWS OF THE LEFT ELBOW. 2 X-RAY VIEWS OF THE LEFT ELBOW. 3 X-RAY VIEWS OF THE LEFT ELBOW. 6/25/2020 9:29 pm COMPARISON: 6/25/2020 HISTORY: Elbow dislocation with multiple attempted reductions. FINDINGS: Sequential images of attempted reduction of fracture dislocation of the elbow. Eight total attempts. The final postreduction image shows reduced joint with large joint effusion. Probable coronoid fracture.   No unexpected reduction Reason for Exam: Post reduction by Dr Anant Cat Acuity: Acute Type of Exam: Initial Mechanism of Injury: Post reduction by Dr Sybil Mahmood: Interval reduction of elbow dislocation. Alignment now normal.  No definite fracture. Minimal spurring of the ulnar coronoid process. Suspected chondrocalcinosis. Diffuse soft tissue swelling with joint effusion. Normal alignment of the left elbow following reduction. No definite fracture. Xr Elbow Left (min 3 Views)    Result Date: 6/25/2020  EXAMINATION: 1 X-RAY VIEW OF THE LEFT ELBOW. 1 X-RAY VIEW OF THE LEFT ELBOW. 1 X-RAY VIEW OF THE LEFT ELBOW. 1 X-RAY VIEW OF THE LEFT ELBOW. 1 X-RAY VIEW OF THE LEFT ELBOW. 2 X-RAY VIEWS OF THE LEFT ELBOW. 2 X-RAY VIEWS OF THE LEFT ELBOW. 3 X-RAY VIEWS OF THE LEFT ELBOW. 6/25/2020 9:29 pm COMPARISON: 6/25/2020 HISTORY: Elbow dislocation with multiple attempted reductions. FINDINGS: Sequential images of attempted reduction of fracture dislocation of the elbow. Eight total attempts. The final postreduction image shows reduced joint with large joint effusion. Probable coronoid fracture. No unexpected radiopaque foreign body. Generalized soft tissue swelling. Cross-sectional imaging could be obtained to assess for subtle fractures. Multiple sequential images document attempted reduction with the final image showing improved alignment and probable coronoid fracture. Xr Elbow Left (min 3 Views)    Result Date: 6/25/2020  EXAMINATION: THREE XRAY VIEWS OF THE LEFT ELBOW 6/25/2020 5:42 pm COMPARISON: 06/22/2020 HISTORY: ORDERING SYSTEM PROVIDED HISTORY: Left elbow dislocation, patient demented and attempting ot remove splint TECHNOLOGIST PROVIDED HISTORY: Left elbow dislocation, patient demented and attempting ot remove splint Acuity: Acute Type of Exam: Unknown FINDINGS: Elbow is in a a back slab cast which limits bone detail.   There is completed dislocation of the elbow joint with the radius and ulna displaced to the ulnar side with the proximal radius adjacent to the medial epicondyle. Irregularity of the coronoid process suggests fracture. Limited views. Complete elbow dislocation at articulation with the humerus as discussed above. Probable nondisplaced coronoid process fracture. EMERGENCY DEPARTMENT COURSE:  Demented 80year-old patient presenting with left elbow deformity from nursing facility. X-ray revealed acute elbow dislocation and coronoid process fracture. Orthopedic surgery consulted and evaluated patient at bedside. Plan for bedside fracture/dislocation reduction under procedural sedation. Elbow successfully reduced and splinted at bedside per orthopedic surgery under procedural sedation with propofol. Please see orthopedic surgery note for details of reduction. Please see separate procedure note for procedural sedation using propofol. Patient tolerated procedural sedation and reduction well. Signed out to Dr. Yosef Galloway, awaiting final orthopedic surgery recommendations for admission versus discharge and further plans for management of fracture. PROCEDURES:  Procedural sedation and fracture/dislocation reduction. Please see separate procedure notes for each    CONSULTS:  [address] TO HOSPITALIST    CRITICAL CARE:  Please see attending note    FINAL IMPRESSION      1.  Anterior dislocation of left elbow, initial encounter          DISPOSITION / PLAN     DISPOSITION Admitted 06/25/2020 09:32:01 PM      PATIENT REFERRED TO:  Larisa Snider [address] [place], [place] [address]  [phone]            DISCHARGE MEDICATIONS:  Current Discharge Medication List          Chelly Benjamin DO  Emergency Medicine Resident    (Please note that portions of thisnote were completed with a voice recognition program.  Efforts were made to edit the dictations but occasionally words are mis-transcribed.)        Chelly Benjamin DO  Resident  06/25/20 [facility]

## 2020-06-26 PROBLEM — S53.115A ANTERIOR DISLOCATION OF LEFT ELBOW: Status: ACTIVE | Noted: 2020-01-01

## 2020-06-26 NOTE — H&P
15.1 g/dL    Hematocrit 27.2 (L) 36.3 - 47.1 %    MCV 95.4 82.6 - 102.9 fL    MCH 27.7 25.2 - 33.5 pg    MCHC 29.0 28.4 - 34.8 g/dL    RDW 17.7 (H) 11.8 - 14.4 %    Platelets See Reflexed IPF Result 138 - 453 k/uL    MPV NOT REPORTED 8.1 - 13.5 fL    NRBC Automated 0.0 0.0 per 100 WBC    Differential Type NOT REPORTED     WBC Morphology NOT REPORTED     RBC Morphology ANISOCYTOSIS PRESENT     Platelet Estimate NOT REPORTED     Seg Neutrophils 81 (H) 36 - 65 %    Lymphocytes 12 (L) 24 - 43 %    Monocytes 5 3 - 12 %    Eosinophils % 1 1 - 4 %    Basophils 0 0 - 2 %    Immature Granulocytes 1 (H) 0 %    Segs Absolute 8.06 1.50 - 8.10 k/uL    Absolute Lymph # 1.17 1.10 - 3.70 k/uL    Absolute Mono # 0.50 0.10 - 1.20 k/uL    Absolute Eos # 0.06 0.00 - 0.44 k/uL    Basophils Absolute 0.04 0.00 - 0.20 k/uL    Absolute Immature Granulocyte 0.09 0.00 - 0.30 k/uL   Comprehensive Metabolic Panel w/ Reflex to MG    Collection Time: 06/26/20  1:37 AM   Result Value Ref Range    Glucose 107 (H) 70 - 99 mg/dL    BUN 26 (H) 8 - 23 mg/dL    CREATININE 0.83 0.50 - 0.90 mg/dL    Bun/Cre Ratio NOT REPORTED 9 - 20    Calcium 8.1 (L) 8.6 - 10.4 mg/dL    Sodium 139 135 - 144 mmol/L    Potassium 4.8 3.7 - 5.3 mmol/L    Chloride 106 98 - 107 mmol/L    CO2 21 20 - 31 mmol/L    Anion Gap 12 9 - 17 mmol/L    Alkaline Phosphatase 80 35 - 104 U/L    ALT 6 5 - 33 U/L    AST 20 <32 U/L    Total Bilirubin 0.45 0.3 - 1.2 mg/dL    Total Protein 6.1 (L) 6.4 - 8.3 g/dL    Alb 2.6 (L) 3.5 - 5.2 g/dL    Albumin/Globulin Ratio 0.7 (L) 1.0 - 2.5    GFR Non-African American >60 >60 mL/min    GFR African American >60 >60 mL/min    GFR Comment          GFR Staging NOT REPORTED    Protime-INR    Collection Time: 06/26/20  1:37 AM   Result Value Ref Range    Protime 11.8 9.0 - 12.0 sec    INR 1.1    APTT    Collection Time: 06/26/20  1:37 AM   Result Value Ref Range    PTT 18.8 (L) 20.5 - 30.5 sec   Fibrinogen    Collection Time: 06/26/20  1:37 AM   Result Elbow Left (2 Views)    Result Date: 6/25/2020  Grossly anatomic alignment of left elbow following splinting. Widened joint space medially. Xr Elbow Left (min 3 Views)    Result Date: 6/26/2020  Multiple sequential images document attempted reduction with the final image showing improved alignment and probable coronoid fracture. Xr Elbow Left (min 3 Views)    Result Date: 6/25/2020  Limited views. Complete elbow dislocation at articulation with the humerus as discussed above. Probable nondisplaced coronoid process fracture. Xr Elbow Left (min 3 Views)    Result Date: 6/22/2020  Status post successful reduction of a dislocated left elbow. There are small bone fragments in the anterior joint space likely reflecting bone chips from the coronoid process. Elbow joint effusion/hemarthrosis is evident. Xr Chest Portable    Result Date: 6/22/2020  Cardiomegaly with pulmonary venous hypertension but no radiographic CHF. No consolidation or sizable pleural effusion. Calcific ASVD. Unchanged left-sided permanent pacemaker. Assessment :      Hospital Problems           Last Modified POA    Elbow dislocation, unspecified laterality, initial encounter 6/25/2020 Yes          Plan:     Patient status inpatient in the Progressive Unit/Step down    1. Left elbow dislocation with coronoid process fracture: plan for OR with ortho today. Cleared for surgery as low-moderate risk. 2. Covid-19 infection: saturating well. Supportive care. Continue to monitor  3. Resume selected home meds  4. Monitor and control blood pressure  5. Pain control  6. Monitor electrolytes, replete as necessary  7. NPO for surgery  8.  Hold DVT prophylaxis until surgery completed     Consultations:   IP [id number] TO HOSPITALIST    Patient is admitted as inpatient status because of co-morbidities listed above, severity of signs and symptoms as outlined, requirement for current medical therapies and most importantly because of direct risk to patient if care not provided in a hospital setting. Josh Cardona PA-C  6/26/2020  9:03 AM    Copy sent to Dr. Terra Silva primary care provider on file.

## 2020-06-26 NOTE — CONSULTS
[facility] - Initial Consult Note COVID 19 Patient  Today's Date and Time: 6/26/2020, 12:05 PM    Impression :     · COVID 19 Confirmed Infection 6-22-20  · COVID testing negative 6-25  · Lt elbow coronoid fracture  · Lt elbow dislocation s/p reduction  · Dementia  · Atrial fibrillation  · CHF  · CKD IV  · COPD  · HTN    Recommendations:   · Repeat COVID testing  · If testing is negative x 2, OK to DC isolation and transfer from COVID unit  · Monitor off antibiotics  · Surgical prophylaxis per protocol    Medical Decision Making/Summary/Discussion:6/26/2020       · Covid test confirmed positive 6-22. · Repeat Covid test negative  6-25  · Pt admitted with Lt elbow fracture and dislocation  · 81 yo with multiple medical issues including advanced dementia  · To undergo fixation and stabilization when able  Infection Control Recommendations   · Universal Precautions  · Airborne isolation  · Droplet Isolation    Antimicrobial Stewardship Recommendations     · Discontinuation of therapy  Coordination of Outpatient Care:   · Estimated Length of IV antimicrobials:None  · Patient will need Midline Catheter Insertion: TBD  · Patient will need PICC line Insertion: No  · Patient will need: Home IV , [place],  SNF,  LTAC:Yes  · Patient will need outpatient wound care:No    Chief complaint/reason for consultation:   · Concern for COVID infection      History of Present Illness:   EMMANUELLE Dolan is a 80y.o.-year-old  female who was initially admitted on 6/25/2020. Patient seen at the request of Dr. Albin Braswell:    Patient is a 81 yo woman with a PMH significant for dementia, bradycardia, CHF, Atrial fibrillation, CKD IV, COPD, HTN, and HLD. Three days ago she fell at the SNF where she lives and dislocated her elbow. Pt was seen at [facility] where the elbow dislocation was reduced and splinted.  Pt was transferred back to the ED on 6-25 because she would not stop removing the splint    Pt had tested COVID positive on 6-22  She is a DNR CC    Lt elbow xrays showed a probable coronoid fracture and complete dislocation at articulation with the humerus    She underwent successful reduction of the dislocation in the ED, and was then admitted with plans for fixation and stabilization of the elbow. Rapid COVID testing 6-25 in the ED was negative    Patient admitted because of concerns with COVID 19.    CURRENT EVALUATION : 6/26/2020    Afebrile  VS stable - HTN    Patient exhibiting respiratory distress. No  Respiratory secretions:     Patient receiving supplemental oxygen. None  02 sat 99  RR 25    QTc: 479    (Definition of High Risk Comorbid Conditions: Asthma, COPD, Heart Failure of any cause, DM, Hematologic malignancy, Immunocompromised taking >20 mg/day Prednisone). Baseline Number of High Risk Comorbid conditions:    4  Respiratory Support Level Score: (Room Air= 1 ;Supplemental 02 1L to 15 L/min= 2; Intubation and mechanical Ventilation = 3; Failure to support Ventilatory needs resulting in death =4)   1      COVID Risk Stratification:       Predicted Survival Based on COVID-19 Nomogram upon Admission  Total points: 180  14 day survival: 85%  21 day survival: 70%  28 day survival: 50%      NEWS Score:  6-26-20: 9 high risk    NLR: Interpretation >3.5 high risk  Abs Neutrophils:   Abs Lymph:  NLR:     Overall Daily Picture:    Unchanged    Presence of secondary bacterial Infection:  No   Additional antibiotics:  None, Pre operative prophylaxis per protocol    Labs, X rays reviewed: 6/26/2020    BUN: 26  Cr: 0.83    WBC: 9.9  Hb: 7.9  Plat: 268    CRP: 37.8  Ferritin: 416  LDH: 370    Pro Calcitonin:    COVID testing  6-22 positive  6-25 negative  6-26 ordered    Cultures:  Urine:  ·   Blood:  ·   Sputum :  ·   Wound:       CXR:   6-22   Cardiomegaly with pulmonary venous hypertension but no radiographic CHF.  No   consolidation or sizable pleural effusion. Transportation needs     Medical: Not on file     Non-medical: Not on file   Tobacco Use    Smoking status: Former Smoker    Smokeless tobacco: Never Used   Substance and Sexual Activity    Alcohol use: No     Alcohol/week: 0.0 standard drinks    Drug use: No    Sexual activity: Not Currently   Lifestyle    Physical activity     Days per week: Not on file     Minutes per session: Not on file    Stress: Not on file   Relationships    Social connections     Talks on phone: Not on file     Gets together: Not on file     Attends Amish service: Not on file     Active member of club or organization: Not on file     Attends meetings of clubs or organizations: Not on file     Relationship status: Not on file    Intimate partner violence     Fear of current or ex partner: Not on file     Emotionally abused: Not on file     Physically abused: Not on file     Forced sexual activity: Not on file   Other Topics Concern    Not on file   Social History Narrative    Not on file       Family History:     Family History   Problem Relation Age of Onset    High Blood Pressure Other     Other Mother         dementia        Allergies:   Asa [aspirin]; Feldene [piroxicam]; and Levaquin [levofloxacin]     Review of Systems:     6/26/2020 UTO pt with advanced dementia    Constitutional: No fevers or chills. No systemic complaints  Head: No headaches  Eyes: No double vision or blurry vision. No conjunctival inflammation. ENT: No sore throat or runny nose. . No hearing loss, tinnitus or vertigo. Cardiovascular: No chest pain or palpitations. No shortness of breath. No [place]  Lung: No shortness of breath or cough. No sputum production  Abdomen: No nausea, vomiting, diarrhea, or abdominal pain. Ashley Budd No cramps. Genitourinary: No increased urinary frequency, or dysuria. No hematuria. No suprapubic or CVA pain  Musculoskeletal: No muscle aches or pains.  No joint effusions, swelling or deformities  Hematologic: No bleeding or the last 72 hours. Lab Results   Component Value Date    MUCUS NOT REPORTED 05/31/2020    RBC 2.85 06/26/2020    TRICHOMONAS NOT REPORTED 05/31/2020    WBC 9.9 06/26/2020    YEAST NOT REPORTED 05/31/2020    TURBIDITY SLIGHTLY CLOUDY 05/31/2020     Lab Results   Component Value Date    CREATININE 0.83 06/26/2020    GLUCOSE 107 06/26/2020       Medical Decision Making-Imaging:     EXAMINATION:   ONE XRAY VIEW OF THE CHEST       6/22/2020 4:38 pm       COMPARISON:   AP chest from 10/26/2016       HISTORY:   ORDERING SYSTEM PROVIDED HISTORY: Pre-op   TECHNOLOGIST PROVIDED HISTORY:   Pre-op   Reason for Exam: pre-op   Acuity: Unknown   Type of Exam: Unknown       History of hypertension, congestive heart failure, chronic obstructive   pulmonary disease, and CKD.       FINDINGS:   Left-sided subclavian approach permanent pacemaker with 2 unchanged electrode   leads.  Necklace and overlying additional cables on the right.       Enlarged but stable appearing cardiac silhouette.  Heavily calcified mildly   tortuous thoracic aorta; mediastinal structures remain midline and unchanged   with prominent central hilar vessels.       Some cephalization of blood flow but no clear cut radiographic CHF.  No   localized pulmonary opacity or blunting of the costophrenic angles.       Bones and soft tissues appear intact.           Impression   Cardiomegaly with pulmonary venous hypertension but no radiographic CHF.  No   consolidation or sizable pleural effusion.  Calcific ASVD.  Unchanged   left-sided permanent pacemaker.          Medical Decision [id number]:       Medical Decision Making-Other:     Note:  · Labs, medications, radiologic studies were reviewed with personal review of films  · Moderate Large amounts of data were reviewed  · Discussed with nursing Staff, Discharge planner  · Infection Control and Prevention measures reviewed  · All prior entries were reviewed  · Administer medications as ordered  · Prognosis:

## 2020-06-26 NOTE — ED NOTES
Report given to [facility], RN.  All questions answered     Josef Alfred RN  06/25/20 [id number]

## 2020-06-26 NOTE — PROGRESS NOTES
Nutrition Assessment    Type and Reason for Visit: Initial, Positive Nutrition Screen(Wound, Dif Chewing/Swallowing)    Nutrition Recommendations:   - Continue NPO. Monitor for start of oral diet as able/appropriate. - Will monitor labs, bowel function, and plan of care. Nutrition Assessment: RNC for wound and difficulty chewing/swallowing. Admitted d/t elbow pain. Pt with recent left elbow dislocation - plan for surgery today. Remains NPO currently. COVID results pending. RN reports unsure if pt has any open wounds. Noted pt with hx of dementia, COPD, and CHF. Will monitor for start of oral diet. Malnutrition Assessment:  · Malnutrition Status: At risk for malnutrition  · Context: Acute illness or injury  · Findings of the 6 clinical characteristics of malnutrition (Minimum of 2 out of 6 clinical characteristics is required to make the diagnosis of moderate or severe Protein Calorie Malnutrition based on AND/ASPEN Guidelines):  1. Energy Intake-Less than or equal to 75% of estimated energy requirement, (since admission)    2. Weight Loss-No significant weight loss, in 6 months  3. Fat Loss-No significant subcutaneous fat loss,    4. Muscle Loss-No significant muscle mass loss,    5. Fluid Accumulation-No significant fluid accumulation,      Nutrition Risk Level: Moderate    Nutrient Needs:  · Estimated Daily Total Kcal: 2108-9372 kcals/day  · Estimated Daily Protein (g): 60-75 gm pro/day    Nutrition Diagnosis:   · Problem: Inadequate oral intake  · Etiology: related to Acute injury/trauma(Current Medical Condition)     Signs and symptoms:  as evidenced by NPO status due to medical condition    Objective Information:  · Nutrition-Focused Physical Findings: Labs/Meds reviewed. · Wound Type: (RN unsure if any open areas present.   Areas of redness noted.)  · Current Nutrition Therapies:  · Oral Diet Orders: NPO   · Anthropometric Measures:  · Ht: 5' 7\" (170.2 cm)   · Current Body Wt: 152 lb 3.2 oz

## 2020-06-26 NOTE — CONSULTS
(SYNTHROID) 200 MCG tablet Take 200 mcg by mouth Daily    Historical Provider, MD   mupirocin (BACTROBAN) 2 % cream Apply 3 times daily. 5/6/19   Ermias Valencia MD   Magnesium 400 MG CAPS Take by mouth 2 times daily    Historical Provider, MD   metoprolol succinate (TOPROL XL) 50 MG extended release tablet Take 50 mg by mouth daily    Historical Provider, MD   pantoprazole (PROTONIX) 40 MG tablet Take 40 mg by mouth daily    Historical Provider, MD   clopidogrel (PLAVIX) 75 MG tablet Take 75 mg by mouth daily    Historical Provider, MD   ferrous gluconate (FERGON) 324 (38 FE) MG tablet Take 324 mg by mouth daily (with breakfast)    Historical Provider, MD   Cholecalciferol (VITAMIN D-3) 1000 UNITS CAPS Take 1,000 mg by mouth nightly     Historical Provider, MD   lovastatin (MEVACOR) 40 MG tablet Take 40 mg by mouth Daily with supper     Historical Provider, MD   citalopram (CELEXA) 20 MG tablet Take 20 mg by mouth nightly     Historical Provider, MD       Allergies:    Francetta Dex [aspirin];  Feldene [piroxicam]; and Levaquin [levofloxacin]    Social History:   Social History     Socioeconomic History    Marital status: Single     Spouse name: None    Number of children: None    Years of education: None    Highest education level: None   Occupational History    None   Social Needs    Financial resource strain: None    Food insecurity     Worry: None     Inability: None    Transportation needs     Medical: None     Non-medical: None   Tobacco Use    Smoking status: Former Smoker    Smokeless tobacco: Never Used   Substance and Sexual Activity    Alcohol use: No     Alcohol/week: 0.0 standard drinks    Drug use: No    Sexual activity: Not Currently   Lifestyle    Physical activity     Days per week: None     Minutes per session: None    Stress: None   Relationships    Social connections     Talks on phone: None     Gets together: None     Attends Hinduism service: None     Active member of club or

## 2020-06-26 NOTE — PROCEDURES
POST-SEDATION EXAM: Lungs: clear to auscultation bilaterally    COMPLICATIONS: none     Connie Carter DO  11:06 PM, 6/25/20

## 2020-06-26 NOTE — PLAN OF CARE
Problem: Airway Clearance - Ineffective  Goal: Achieve or maintain patent airway  6/26/2020 1114 by Mike Billingsley RN  Outcome: Ongoing  6/26/2020 0404 by Mahamed Wilson RN  Outcome: Ongoing

## 2020-06-26 NOTE — PROGRESS NOTES
Orthopedic Progress Note    Patient:  EMMANUELLE Love  YOB: 1920     80 y.o. female    Subjective:  Patient seen and examined at bedside. Patient demented. Unable to obtain ROS or respond to my questioning. Nursing placed in restraints due to constant movement of her limbs. Vitals reviewed, afebrile    Objective:   Vitals:    06/26/20 0400   BP: (!) 151/63   Pulse: 70   Resp: 22   Temp: 98.6 °F (37 °C)   SpO2: 96%     Gen: Demented, unresponsive to questioning or exam  Cardiovascular: Regular rate, no dependent edema, distal pulses 2+  Respiratory: Chest symmetric, no accessory muscle use, normal respirations, no audible wheezes    LUE: Splint in place to LUE appearing c/d/i. Complete msk/neurovascular exam difficult to obtain due to dementia. Compartments soft to exposed limb. Patient appropriately wiggles all her fingers in bed. Hand and fingers are warm and well perfused with BCR. Recent Labs     06/26/20  0137   WBC 9.9   HGB 7.9*   HCT 27.2*   PLT See Reflexed IPF Result   INR 1.1      K 4.8   BUN 26*   CREATININE 0.83   GLUCOSE 107*      Meds: Plavix  See rec for complete list    Impression/plan: 80 y.o. female being seen with a left elbow dislocation status post closed reduction in the ED    -COVID + on 6/22, COVID - on 6/25.  -Plan for OR today for fixation and stabilization of the left elbow.  -Will require medical clearance from appropriate primary/consulting teams  -WB status: No heavy lifting, pushing, pulling LUE  -Medicine team primary  -Pain control PO/IV Medication. Attempt to Wean IV medications.    -DVT ppx: Please hold today for surgery  -Ice/Elevate for swelling  -Encourage Incentive Spirometry use  -Please page DO ortho with any questions    Juan Sosa,   Orthopedic Surgery Resident, PGY-1  [address] [facility]

## 2020-06-26 NOTE — PLAN OF CARE
Problem: Airway Clearance - Ineffective  Goal: Achieve or maintain patent airway  Outcome: Ongoing     Problem: Gas Exchange - Impaired  Goal: Absence of hypoxia  Outcome: Ongoing  Goal: Promote optimal lung function  Outcome: Ongoing     Problem: Breathing Pattern - Ineffective  Goal: Ability to achieve and maintain a regular respiratory rate  Outcome: Ongoing     Problem:  Body Temperature -  Risk of, Imbalanced  Goal: Ability to maintain a body temperature within defined limits  Outcome: Ongoing  Goal: Will regain or maintain usual level of consciousness  Outcome: Ongoing  Goal: Complications related to the disease process, condition or treatment will be avoided or minimized  Outcome: Ongoing     Problem: Isolation Precautions - Risk of Spread of Infection  Goal: Prevent transmission of infection  Outcome: Ongoing     Problem: Nutrition Deficits  Goal: Optimize nutrtional status  Outcome: Ongoing     Problem: Risk for Fluid Volume Deficit  Goal: Maintain normal heart rhythm  Outcome: Ongoing  Goal: Maintain absence of muscle cramping  Outcome: Ongoing  Goal: Maintain normal serum potassium, sodium, calcium, phosphorus, and pH  Outcome: Ongoing     Problem: Loneliness or Risk for Loneliness  Goal: Demonstrate positive use of time alone when socialization is not possible  Outcome: Ongoing     Problem: Fatigue  Goal: Verbalize increase energy and improved vitality  Outcome: Ongoing     Problem: Patient Education: Go to Patient Education Activity  Goal: Patient/Family Education  Outcome: Ongoing     Problem: Skin Integrity:  Goal: Will show no infection signs and symptoms  Description: Will show no infection signs and symptoms  Outcome: Ongoing  Goal: Absence of new skin breakdown  Description: Absence of new skin breakdown  Outcome: Ongoing     Problem: Confusion - Acute:  Goal: Absence of continued neurological deterioration signs and symptoms  Description: Absence of continued neurological deterioration signs and nonreality-based thinking  Description: Able to distinguish between reality-based and nonreality-based thinking  Outcome: Ongoing  Goal: Able to interrupt nonreality-based thinking  Description: Able to interrupt nonreality-based thinking  Outcome: Ongoing     Problem: Sleep Pattern Disturbance:  Goal: Appears well-rested  Description: Appears well-rested  Outcome: Ongoing     Problem: SAFETY  Goal: Free from accidental physical injury  Outcome: Ongoing  Goal: Free from intentional harm  Outcome: Ongoing     Problem: DAILY CARE  Goal: Daily care needs are met  Outcome: Ongoing     Problem: PAIN  Goal: Patient's pain/discomfort is manageable  Outcome: Ongoing     Problem: SKIN INTEGRITY  Goal: Skin integrity is maintained or improved  Outcome: Ongoing     Problem: KNOWLEDGE DEFICIT  Goal: Patient/S.O. demonstrates understanding of disease process, treatment plan, medications, and discharge instructions.   Outcome: Ongoing     Problem: DISCHARGE BARRIERS  Goal: Patient's continuum of care needs are met  Outcome: Ongoing

## 2020-06-27 NOTE — ANESTHESIA PRE PROCEDURE
Department of Anesthesiology  Preprocedure Note       Name:  EMMANUELLE Lloyd   Age:  80 y.o.  :  1920                                          MRN:  [id number]         Date:  2020      Surgeon: Clarissa Venegas):  Julio Colorado MD    Procedure: Procedure(s):  LEFT ELBOW CLOSED REDUCTION PERCUTANEOUS PINNING, 3080, HAND TABLE, SUPINE, C-ARM, CAST/SPLINT CART, K-WIRES, AND DRILL AVAILABLE    Medications prior to admission:   Prior to Admission medications    Medication Sig Start Date End Date Taking? Authorizing Provider   midodrine (PROAMATINE) 5 MG tablet Take 1 tablet by mouth 3 times daily Hold for a systolic BP greater than 863. 20   Ralf Martinez MD   fluticasone [facility]) 50 MCG/ACT nasal spray 2 sprays into each nostril daily 20   Hussain Valerio MD   levothyroxine (SYNTHROID) 200 MCG tablet Take 200 mcg by mouth Daily    Historical Provider, MD   mupirocin (BACTROBAN) 2 % cream Apply 3 times daily.  19   Hussain Valerio MD   Magnesium 400 MG CAPS Take by mouth 2 times daily    Historical Provider, MD   metoprolol succinate (TOPROL XL) 50 MG extended release tablet Take 50 mg by mouth daily    Historical Provider, MD   pantoprazole (PROTONIX) 40 MG tablet Take 40 mg by mouth daily    Historical Provider, MD   clopidogrel (PLAVIX) 75 MG tablet Take 75 mg by mouth daily    Historical Provider, MD   ferrous gluconate (FERGON) 324 (38 FE) MG tablet Take 324 mg by mouth daily (with breakfast)    Historical Provider, MD   Cholecalciferol (VITAMIN D-3) 1000 UNITS CAPS Take 1,000 mg by mouth nightly     Historical Provider, MD   lovastatin (MEVACOR) 40 MG tablet Take 40 mg by mouth Daily with supper     Historical Provider, MD   citalopram (CELEXA) 20 MG tablet Take 20 mg by mouth nightly     Historical Provider, MD       Current medications:    Current Facility-Administered Medications   Medication Dose Route Frequency Provider Last Rate Last Dose    [MAR Hold] citalopram (CELEXA) tablet Date    Atrial fibrillation ([facility] [address]     Bradycardia     [facility]     Chronic kidney disease     CKD (chronic kidney disease) stage 4, GFR 15-29 ml/min ([facility]) 4/13/2018    COPD [facility]chronic obstructive pulmonary disease) ([facility])     Hyperlipidemia     Hypertension     Hypothyroidism     Short-term memory loss     Swelling of both lower extremities 09/15/1993       Past Surgical History:        Procedure Laterality Date    APPENDECTOMY  2009    COLONOSCOPY  08/27/2004    PACEMAKER PLACEMENT  10/25/2016    Dual chamber PPM- [facility]    SMALL INTESTINE SURGERY  10/2009    Small part of bowel removed    TOTAL HIP ARTHROPLASTY Left     had the surgery done twice       Social History:    Social History     Tobacco Use    Smoking status: Former Smoker    Smokeless tobacco: Never Used   Substance Use Topics    Alcohol use: No     Alcohol/week: 0.0 standard drinks                                Counseling given: Not Answered      Vital Signs (Current):   Vitals:    06/26/20 1600 06/27/20 0000 06/27/20 0400 06/27/20 0637   BP: (!) 155/67   (!) 156/90   Pulse: 70   70   Resp: 30   22   Temp: 97.6 °F (36.4 °C) 97.8 °F (36.6 °C) 98.7 °F (37.1 °C)    TempSrc: Oral  Axillary    SpO2: 96%   100%   Weight:       Height:                                                  BP Readings from Last 3 Encounters:   06/27/20 (!) 156/90   06/22/20 (!) 130/95   06/04/20 120/67       NPO Status:                                                                                 BMI:   Wt Readings from Last 3 Encounters:   06/26/20 152 lb 3.2 oz (69 kg)   06/22/20 160 lb (72.6 kg)   06/22/20 160 lb (72.6 kg)     Body mass index is 23.84 kg/m².     CBC:   Lab Results   Component Value Date    WBC 9.9 06/26/2020    RBC 2.85 06/26/2020    HGB 7.9 06/26/2020    HCT 27.2 06/26/2020    MCV 95.4 06/26/2020    RDW 17.7 06/26/2020    PLT See Reflexed IPF Result 06/26/2020       CMP:   Lab Results   Component Value Date    NA

## 2020-06-27 NOTE — PROGRESS NOTES
Orthopedic Progress Note    Patient:  EMMANUELLE Casas  YOB: 1920     80 y.o. female    Subjective:  Patient seen and examined at bedside. Patient with significant dementia. Unable to obtain ROS or respond to my questioning. Plan for surgery this AM with orthopedics for fixation of left elbow. Vitals reviewed, afebrile    Objective:   Vitals:    06/27/20 0400   BP:    Pulse:    Resp:    Temp: 98.7 °F (37.1 °C)   SpO2:      Gen: Demented, unresponsive to questioning or exam  Cardiovascular: Regular rate, no dependent edema, distal pulses 2+  Respiratory: Chest symmetric, no accessory muscle use, normal respirations, no audible wheezes    LUE: Splint in place to LUE appearing clean, dry, intact. Complete sensorimotor exam difficult to obtain due to dementia. Compartments soft to exposed limb. Patient appropriately spontaneously wiggles all her fingers in bed. Hand and fingers are warm and well perfused with BCR. Recent Labs     06/26/20  0137   WBC 9.9   HGB 7.9*   HCT 27.2*   PLT See Reflexed IPF Result   INR 1.1      K 4.8   BUN 26*   CREATININE 0.83   GLUCOSE 107*      Meds: Plavix  See rec for complete list    Impression/plan: 80 y.o. female being seen with a left elbow dislocation status post closed reduction in the ED    -COVID postive on 6/22, COVID negative on 6/25 and 6/26.  -Plan for OR today for fixation and stabilization of the left elbow  -Cleared by internal medicine  -WB status: No heavy lifting, pushing, pulling LUE  -Internal medicine team primary  -Pain control PO/IV Medication. Attempt to Wean IV medications.    -DVT ppx: Please hold today for surgery  -Ice/Elevate for swelling  -Encourage IS use  -Please page ortho with any questions    Mariia Oneil, DO  PGY-1 Orthopedic Surgery  6:25 AM 6/27/2020

## 2020-06-27 NOTE — DISCHARGE INSTR - COC
Continuity of Care Form    Patient Name: EMMANUELLE Black   :  1920  MRN:  [id number]    Admit date:  2020  Discharge date:      Code Status Order: [facility]   Advance Directives:   [address] Directive Type of Healthcare Directive Copy in [address] Agent's Name Healthcare Agent's Phone Number    20 [phone]  Unknown, patient unable to respond due to medical condition patient's nephew Thea Ryder signs patient's consents  --  --  --  --  --    203  --  --  --  Healthcare power of   Thea Aleksey   [phone]    20 0307  Yes, patient has an advance directive for healthcare treatment  Health care treatment directive;Durable power of  for health care  Yes, copy in chart  Healthcare power of  Thea Ryder nephew  --  --          Admitting Physician:  Gayle Langston DO  PCP: Sohail Cordoba MD    Discharging Nurse: [facility] Unit/Room#: 3007/3007-01  Discharging Unit Phone Number: ***    Emergency Contact:   Extended Emergency Contact Information  Primary Emergency Contact: Armani Morris  Address: [place] [address] Phone: [phone]  Mobile Phone: [phone]  Relation: Niece/Nephew  Contact is hearing impaired.   Hearing or visual needs: Hearing Aid  Secondary Emergency Contact: Onofre Harkins [address] Phone: [phone]  Relation: Niece/Nephew    Past Surgical History:  Past Surgical History:   Procedure Laterality Date    APPENDECTOMY  2009    COLONOSCOPY  2004    ELBOW SURGERY Left     Left elbow closed reduction with percutaneous pinning    PACEMAKER PLACEMENT  10/25/2016    Dual chamber PPM- [facility]    SMALL INTESTINE SURGERY  10/2009    Small part of bowel removed    TOTAL HIP ARTHROPLASTY Left     had the surgery done twice       Immunization History:   Immunization History   Administered Date(s) Administered

## 2020-06-27 NOTE — OP NOTE
None    Post-operative Condition: Stable      Electronically signed by Renny Mercer MD on 6/27/2020 at 8:31 AM

## 2020-06-27 NOTE — PROGRESS NOTES
Dr. Joshua Meade at patient's bedside to evaluate patient, Dr. Joshua Meade stated patient can return to her room

## 2020-06-27 NOTE — ANESTHESIA POSTPROCEDURE EVALUATION
Department of Anesthesiology  Postprocedure Note    Patient: EMMANUELLE Oates  MRN: [id number]  YOB: 1920  Date of evaluation: 6/27/2020  Time:  9:59 AM     Procedure Summary     Date:  06/27/20 Room / Location:  [place] [address]    Anesthesia Start:  2870 Anesthesia Stop:  4009    Procedure:  LEFT ELBOW CLOSED REDUCTION PERCUTANEOUS PINNING, 9213, HAND TABLE, SUPINE, C-ARM, CAST/SPLINT CART, K-WIRES, AND DRILL AVAILABLE (Left Elbow) Diagnosis:  (LEFT ELBOW DISLOCATION)    Surgeon:  Roxane Newman MD Responsible Provider:  Elsa Mary MD    Anesthesia Type:  general ASA Status:  4 - Emergent          Anesthesia Type: general    Evangelina Phase I: Evangelina Score: 8    Evangelina Phase II:      Last vitals: Reviewed and per EMR flowsheets.        Anesthesia Post Evaluation    Patient location during evaluation: PACU  Patient participation: complete - patient cannot participate  Level of consciousness: confused (patient with dementia, back to pre-op baseline mental status)  Pain score: 0  Airway patency: patent  Nausea & Vomiting: no nausea and no vomiting  Complications: no  Cardiovascular status: hemodynamically stable  Respiratory status: acceptable, spontaneous ventilation and room air  Hydration status: euvolemic

## 2020-06-27 NOTE — PROGRESS NOTES
[facility] - Progress Note   COVID 19 Patient  Today's Date and Time: 6/27/2020, 2:43 PM    Impression :     · COVID 19 Confirmed Infection 6-22-20  · COVID tests:  · 6-21-20: Positive  · 6-25-20: negative (rapid test)  · 6-26-20: negative (PCR)  · Lt elbow coronoid fracture  · Lt elbow dislocation s/p reduction  · Dementia  · Atrial fibrillation  · CHF  · CKD IV  · COPD  · HTN  · ORIF Lt elbow fracture 6-27-20    Recommendations:   · OK to DC isolation and transfer from COVID unit  · Monitor off antibiotics    Medical Decision Making/Summary/Discussion:6/27/2020       · Covid test confirmed positive 6-22. · Repeat Covid test negative  6-25  · Pt admitted with Lt elbow fracture and dislocation  · 79 yo with multiple medical issues including advanced dementia  · To undergo fixation and stabilization when able  Infection Control Recommendations   · Universal Precautions  · Airborne isolation  · Droplet Isolation    Antimicrobial Stewardship Recommendations     · Discontinuation of therapy  Coordination of Outpatient Care:   · Estimated Length of IV antimicrobials:None  · Patient will need Midline Catheter Insertion: TBD  · Patient will need PICC line Insertion: No  · Patient will need: Home IV , [place],  SNF,  LTAC:Yes  · Patient will need outpatient wound care:No    Chief complaint/reason for consultation:   · Concern for COVID infection      History of Present Illness:   B. Elaine Closs is a 80y.o.-year-old  female who was initially admitted on 6/25/2020. Patient seen at the request of Dr. Carol Turk:    Patient is a 79 yo woman with a PMH significant for dementia, bradycardia, CHF, Atrial fibrillation, CKD IV, COPD, HTN, and HLD. Three days ago she fell at the SNF where she lives and dislocated her elbow. Pt was seen at [facility] where the elbow dislocation was reduced and splinted.  Pt was transferred back to the ED on 6-25 because she would not pulmonary venous hypertension but no radiographic CHF.  No   consolidation or sizable pleural effusion.  Calcific ASVD.  Unchanged   left-sided permanent pacemaker. CAT:      Discussed with patient, RN, IM. I have personally reviewed the past medical history, past surgical history, medications, social history, and family history, and I have updated the database accordingly.   Past Medical History:     Past Medical History:   Diagnosis Date    Atrial fibrillation ([address]     Bradycardia     [facility]     Chronic kidney disease     CKD (chronic kidney disease) stage 4, GFR 15-29 ml/min ([facility]) 4/13/2018    COPD (chronic obstructive pulmonary disease) ([facility])     Hyperlipidemia     Hypertension     Hypothyroidism     Short-term memory loss     Swelling of both lower extremities 09/15/1993       Past Surgical  History:     Past Surgical History:   Procedure Laterality Date    APPENDECTOMY  2009    COLONOSCOPY  08/27/2004    ELBOW SURGERY Left     Left elbow closed reduction with percutaneous pinning    PACEMAKER PLACEMENT  10/25/2016    Dual chamber PPM- [facility]    SMALL INTESTINE SURGERY  10/2009    Small part of bowel removed    TOTAL HIP ARTHROPLASTY Left     had the surgery done twice       Medications:      ceFAZolin  2 g Intravenous Q8H    citalopram  20 mg Oral Nightly    clopidogrel  75 mg Oral Daily    ferrous gluconate  324 mg Oral Daily with breakfast    fluticasone  2 spray Each Nostril Daily    levothyroxine  200 mcg Oral Daily    atorvastatin  10 mg Oral Daily    metoprolol succinate  50 mg Oral Daily    midodrine  5 mg Oral TID    pantoprazole  40 mg Oral Daily    sodium chloride flush  10 mL Intravenous 2 times per day       Social History:     Social History     Socioeconomic History    Marital status: Single     Spouse name: Not on file    Number of children: Not on file    Years of education: Not on file    Highest education level: Not on Hepatic Function Panel:   Recent Labs     06/26/20  0137   PROT 6.1*   LABALBU 2.6*   BILITOT 0.45   ALKPHOS 80   ALT 6   AST 20     No results for input(s): RPR in the last 72 hours. No results for input(s): HIV in the last 72 hours. No results for input(s): BC in the last 72 hours. Lab Results   Component Value Date    MUCUS NOT REPORTED 05/31/2020    RBC 2.85 06/26/2020    TRICHOMONAS NOT REPORTED 05/31/2020    WBC 9.9 06/26/2020    YEAST NOT REPORTED 05/31/2020    TURBIDITY SLIGHTLY CLOUDY 05/31/2020     Lab Results   Component Value Date    CREATININE 0.83 06/26/2020    GLUCOSE 107 06/26/2020       Medical Decision Making-Imaging:     EXAMINATION:   ONE XRAY VIEW OF THE CHEST       6/22/2020 4:38 pm       COMPARISON:   AP chest from 10/26/2016       HISTORY:   ORDERING SYSTEM PROVIDED HISTORY: Pre-op   TECHNOLOGIST PROVIDED HISTORY:   Pre-op   Reason for Exam: pre-op   Acuity: Unknown   Type of Exam: Unknown       History of hypertension, congestive heart failure, chronic obstructive   pulmonary disease, and CKD.       FINDINGS:   Left-sided subclavian approach permanent pacemaker with 2 unchanged electrode   leads.  Necklace and overlying additional cables on the right.       Enlarged but stable appearing cardiac silhouette.  Heavily calcified mildly   tortuous thoracic aorta; mediastinal structures remain midline and unchanged   with prominent central hilar vessels.       Some cephalization of blood flow but no clear cut radiographic CHF.  No   localized pulmonary opacity or blunting of the costophrenic angles.       Bones and soft tissues appear intact.           Impression   Cardiomegaly with pulmonary venous hypertension but no radiographic CHF.  No   consolidation or sizable pleural effusion.  Calcific ASVD.  Unchanged   left-sided permanent pacemaker.          Medical Decision Kitsjd-Zkmozkzy-Yrlaw:       Medical Decision Making-Other:     Note:  · Labs, medications, radiologic studies were

## 2020-06-27 NOTE — PROGRESS NOTES
extremities 09/15/1993      Allergies: Allergies   Allergen Reactions    Asa [Aspirin]     Feldene [Piroxicam]     Levaquin [Levofloxacin] Itching      Medications :  [MAR Hold] citalopram, 20 mg, Oral, Nightly  [MAR Hold] clopidogrel, 75 mg, Oral, Daily  [MAR Hold] ferrous gluconate, 324 mg, Oral, Daily with breakfast  [MAR Hold] fluticasone, 2 spray, Each Nostril, Daily  [MAR Hold] levothyroxine, 200 mcg, Oral, Daily  [MAR Hold] atorvastatin, 10 mg, Oral, Daily  [MAR Hold] metoprolol succinate, 50 mg, Oral, Daily  [MAR Hold] midodrine, 5 mg, Oral, TID  [MAR Hold] pantoprazole, 40 mg, Oral, Daily  [MAR Hold] sodium chloride flush, 10 mL, Intravenous, 2 times per day        Review of Systems   Review of Systems   Unable to perform ROS: Dementia   Constitutional: Negative for activity change, appetite change, chills, fatigue and fever. Respiratory: Negative for cough and shortness of breath. Gastrointestinal: Negative for abdominal distention and abdominal pain. Objective :      Current Vitals : Temp: 98.7 °F (37.1 °C),  Pulse: 70, Resp: 22, BP: (!) 156/90, SpO2: 100 %  Last 24 Hrs Vitals   Patient Vitals for the past 24 hrs:   BP Temp Temp src Pulse Resp SpO2   06/27/20 0637 (!) 156/90 -- -- 70 22 100 %   06/27/20 0400 -- 98.7 °F (37.1 °C) Axillary -- -- --   06/27/20 0000 -- 97.8 °F (36.6 °C) -- -- -- --   06/26/20 1600 (!) 155/67 97.6 °F (36.4 °C) Oral 70 30 96 %   06/26/20 1500 (!) 146/80 -- -- 70 25 --   06/26/20 1300 (!) 168/66 -- -- 74 26 --   06/26/20 1231 (!) 147/78 97.4 °F (36.3 °C) Oral 70 27 97 %   06/26/20 1200 (!) 154/86 -- -- 70 (!) 32 --   06/26/20 1000 (!) 161/66 -- -- 70 24 --   06/26/20 0900 (!) 156/67 -- -- 70 24 --     Intake / output   06/26 0701 - 06/27 0700  In: 110 [P.O.:100; I.V.:10]  Out: 725 [Urine:725]  Physical Exam:  Physical Exam  Vitals signs and nursing note reviewed. Constitutional:       General: She is not in acute distress. Appearance: She is not diaphoretic. attempted reduction with the final image showing improved alignment and probable coronoid fracture. Xr Elbow Left (2 Views)    Result Date: 6/26/2020  Multiple sequential images document attempted reduction with the final image showing improved alignment and probable coronoid fracture. Xr Elbow Left (2 Views)    Result Date: 6/25/2020  Grossly anatomic alignment of left elbow following splinting. Widened joint space medially. Xr Elbow Left (min 3 Views)    Result Date: 6/26/2020  Multiple sequential images document attempted reduction with the final image showing improved alignment and probable coronoid fracture. Xr Elbow Left (min 3 Views)    Result Date: 6/25/2020  Limited views. Complete elbow dislocation at articulation with the humerus as discussed above. Probable nondisplaced coronoid process fracture. Xr Elbow Left (min 3 Views)    Result Date: 6/22/2020  Status post successful reduction of a dislocated left elbow. There are small bone fragments in the anterior joint space likely reflecting bone chips from the coronoid process. Elbow joint effusion/hemarthrosis is evident. Xr Chest Portable    Result Date: 6/22/2020  Cardiomegaly with pulmonary venous hypertension but no radiographic CHF. No consolidation or sizable pleural effusion. Calcific ASVD. Unchanged left-sided permanent pacemaker. Clinical Course : stable  Assessment and Plan  :        1. Unstable left ankle dislocation s/p left elbow closed reduction with percutaneous pinning  2. Advanced senile dementia - at baseline. 3. Hypothyroidism - on synthroid. 4. COPD - stable. albuterol as needed. 5. CKD stage III - stable . 6. Paroxysmal atrial fibrillation - on Plavix. Not on anticoagulation. Continue to monitor vitals , Intake / output ,  Cell count , HGB , Kidney function, oxygenation  as indicated . Plan and updates discussed with patient ,  answers  explained to satisfaction.    Plan discussed

## 2020-06-27 NOTE — BRIEF OP NOTE
Brief Postoperative Note      Patient: EMMANUELLE Alvarado  YOB: 1920  MRN: [id number]    Date of Procedure: 6/27/2020    Pre-Op Diagnosis: Unstable left elbow dislocation    Post-Op Diagnosis: Same       Procedure(s): Left elbow closed reduction percutaneous pinning of left elbow    Surgeon(s):  Magno Harkins MD    Assistant:  Resident: Sonam Yancey DO; Washington Robles DO    Anesthesia: General    Estimated Blood Loss (mL): 1 mL    Tourniquet time: None    Complications: None    Specimens:   * No specimens in log *    Implants:  Implant Name Type Inv. Item Serial No.  Lot No. LRB No. Used Action   IMPL KWIRE 5/64 9 Screw/Plate/Nail/[place] IMPL KWIRE 5/64 9  [employer]  Left 2 Implanted     Drains:   External Urinary Catheter (Active)   Catheter changed  Yes 6/26/2020  8:00 AM   Urine Color Rosie 6/26/2020  4:00 PM   Urine Appearance Clear 6/26/2020  4:00 PM   Output (mL) 350 mL 6/26/2020  4:00 PM   Suction- Female Only 40 mmgHg continuous 6/26/2020  4:00 PM   Placement Initiated 6/26/2020 12:00 AM   Skin Assessment No Injury 6/26/2020  4:00 PM       Findings: See op note.      Electronically signed by Washington Robles DO on 6/27/2020 at 8:28 AM

## 2020-06-28 PROBLEM — U07.1 COVID-19 VIRUS INFECTION: Status: ACTIVE | Noted: 2020-01-01

## 2020-06-28 NOTE — DISCHARGE SUMMARY
control. Postoperatively she was stable and was discharged back to [facility].         Significant therapeutic interventions:   1. Unstable left ankle dislocation s/p left elbow closed reduction with percutaneous pinning  2. Advanced senile dementia - at baseline. 3. Hypothyroidism - on synthroid. 4. COPD - stable. albuterol as needed. 5. CKD stage III - stable . 6. Paroxysmal atrial fibrillation - on Plavix. Not on anticoagulation. Significant Diagnostic Studies:   Labs / Micro:/Radiology  Recent Labs     06/26/20  0137 06/24/20  0840 06/22/20  1639   WBC 9.9 10.7 10.1   HGB 7.9* 9.1* 8.9*   HCT 27.2* 32.1* 28.4*   MCV 95.4 96.1 87.7   PLT See Reflexed IPF Result [id number]     Labs Renal Latest Ref Rng & Units 6/26/2020 6/24/2020 6/22/2020 6/8/2020 5/28/2020   BUN 8 - 23 mg/dL 26(H) 31(H) 45(H) 44(H) 51(H)   Cr 0.50 - 0.90 mg/dL 0.83 1.06(H) 1.35(H) 1.60(H) 1.49(H)   K 3.7 - 5.3 mmol/L 4.8 4.0 3.9 4.3 4.5   Na 135 - 144 mmol/L 139 146(H) 142 133(L) 134(L)     Lab Results   Component Value Date    ALT 6 06/26/2020    AST 20 06/26/2020    ALKPHOS 80 06/26/2020    BILITOT 0.45 06/26/2020     Lab Results   Component Value Date    TSH 14.44 (H) 10/21/2016     No results found for: HAV, HEPAIGM, HEPBIGM, HEPBCAB, HBEAG, HEPCAB  Lab Results   Component Value Date    COLORU YELLOW 05/31/2020    NITRU POSITIVE 05/31/2020    GLUCOSEU NEGATIVE 05/31/2020    KETUA NEGATIVE 05/31/2020    UROBILINOGEN Normal 05/31/2020    [address] NEGATIVE 05/31/2020     No results found for: LABA1C  No results found for: EAG  Lab Results   Component Value Date    INR 1.1 06/26/2020    INR 1.2 06/22/2020    PROTIME 11.8 06/26/2020    PROTIME 14.8 (H) 06/22/2020       Xr Elbow Left (2 Views)    Result Date: 6/27/2020  Intraprocedural fluoroscopic spot images as above. See separate procedure report for more information.      Xr Elbow Left (2 Views)    Result Date: 6/26/2020  Multiple sequential images document attempted reduction with the final image showing improved alignment and probable coronoid fracture. Xr Elbow Left (2 Views)    Result Date: 6/26/2020  Multiple sequential images document attempted reduction with the final image showing improved alignment and probable coronoid fracture. Xr Elbow Left (2 Views)    Result Date: 6/26/2020  Multiple sequential images document attempted reduction with the final image showing improved alignment and probable coronoid fracture. Xr Elbow Left (2 Views)    Result Date: 6/26/2020  Multiple sequential images document attempted reduction with the final image showing improved alignment and probable coronoid fracture. Xr Elbow Left (2 Views)    Result Date: 6/26/2020  Multiple sequential images document attempted reduction with the final image showing improved alignment and probable coronoid fracture. Xr Elbow Left (2 Views)    Result Date: 6/26/2020  Multiple sequential images document attempted reduction with the final image showing improved alignment and probable coronoid fracture. Xr Elbow Left (2 Views)    Result Date: 6/26/2020  Multiple sequential images document attempted reduction with the final image showing improved alignment and probable coronoid fracture. Xr Elbow Left (2 Views)    Result Date: 6/25/2020  Grossly anatomic alignment of left elbow following splinting. Widened joint space medially. Xr Elbow Left (min 3 Views)    Result Date: 6/27/2020  1. Postoperative changes related to orthopedic pin fixation of the ulnohumeral joint. Improved alignment of the ulnohumeral joint. 2. Suspected coronoid fracture again noted. 3. Artifact from overlying structures external to the patient as well as overlying splint/casting material limits the exam. 4. No superimposed acute osseous abnormality evident.      Xr Elbow Left (min 3 Views)    Result Date: 6/26/2020  Multiple sequential images document attempted reduction with the final image showing improved alignment and medications    citalopram 20 MG tablet  Commonly known as:  CELEXA     clopidogrel 75 MG tablet  Commonly known as:  PLAVIX     ferrous gluconate 324 (38 Fe) MG tablet  Commonly known as:  FERGON     fluticasone 50 MCG/ACT nasal spray  Commonly known as:  Flonase  2 sprays into each nostril daily     levothyroxine 200 MCG tablet  Commonly known as:  SYNTHROID     lovastatin 40 MG tablet  Commonly known as:  MEVACOR     Magnesium 400 MG Caps     metoprolol succinate 50 MG extended release tablet  Commonly known as:  TOPROL XL     midodrine 5 MG tablet  Commonly known as:  PROAMATINE  Take 1 tablet by mouth 3 times daily Hold for a systolic BP greater than 622. mupirocin 2 % cream  Commonly known as: Bactroban  Apply 3 times daily. Protonix 40 MG tablet  Generic drug:  pantoprazole     vitamin D-3 25 MCG (1000 UT) Caps           Where to Get Your Medications      You can get these medications from any pharmacy    Bring a paper prescription for each of these medications  · HYDROcodone-acetaminophen 5-325 MG per tablet         Time Spent on discharge is  35 mins in patient examination, evaluation, counseling as well as medication reconciliation, prescriptions for required medications, discharge plan and follow up. Electronically signed by   Melvina Aguilar MD  6/28/2020        Thank you Dr. Nicolle Cha MD for the opportunity to be involved in this patient's care.

## 2020-07-09 NOTE — TELEPHONE ENCOUNTER
Several calls were made between myself and [facility] this morning in order to get everything arranged for the patient to come into the office today. Nurse stated that a large blood spot had started to form on the patient's cast. After talking with Dr. Brown Moreno, it was decided that the patient would come into our office today at 1pm to be seen by Dr. Brown Moreno again.

## 2020-07-12 NOTE — PROGRESS NOTES
HPI: Ms. Vaishali Espinoza is here today few days after being seen for her left elbow. She has a closed reduction and pinning as a result of recurrent instability in her splint. During her last visit a few days ago she was placed in a cast and she returns today because of bleeding through the cast.    Her cast was taken off in the clinic today and it demonstrates that 1 of her pins had advanced and and was cutting on her skin causing this bleeding. The area was cleaned up with gauze and some ChloraPrep followed by Betadine. The pin was then gradually pulled out and padding placed between her pins and skin. In order to facilitate appropriate management and monitoring I placed her in a padded posterior splint. We will continue to monitor this closely and as a result I will have her follow-up my clinic next week for reevaluation. The plan is to leave her pins in for a total of 4 weeks from when they were placed. Imaging studies: 2 x-ray views of the left elbow completed on 7/9/2020 were reviewed demonstrating her elbow to be well reduced and pins as previously placed without significant change.

## 2020-07-12 NOTE — PROGRESS NOTES
HPI: Ms. Smitha Cardona is a 80year-old here today for follow-up evaluation of her left elbow. She has been dealing with recurrent instability following a traumatic dislocation of her elbow. She is now approximately just over a week status post reduction and pinning of her elbow. She has been in a posterior elbow splint. Evaluation of the patient's left elbow and upper extremity demonstrates moderate swelling about the elbow. There appears to be some mild purulence at the pin sites. There is bloody drainage noted on her dressings. She has a 2+ radial pulse with brisk capillary refill in her fingers. No obvious deformity to the elbow. Imaging studies: 2 x-ray views of the left elbow completed on 7/7/2020 were independently reviewed demonstrating her to be in a cast.  Her elbow appears to be appropriately reduced with 2 traversing pins crossing the ulnohumeral joint. Impression and plan: Ms. Smitha Cardona is a 80year-old who is status post left elbow closed reduction and percutaneous pinning. She appears to be acceptably aligned at this time. As noted above some purulence was noted at the pin site consistent with a superficial infection. Consequently I placed her on a 10-day course of Ancef. Her dressings were changed today and padding was placed between the pins and her skin to avoid any problems with this. She was placed in a long-arm cast.  I will see her back for reevaluation in 3 weeks at which time anticipate taking out her pins and placing her in a hinged elbow brace.

## 2020-07-14 NOTE — PROGRESS NOTES
HPI: Ms. Rafaela Cardona is here for a wound check. She was seen in my clinic about 5 days ago due to bleeding through her cast from her pin site. She returns today in her splint was intact. Her splint was taken off and the dressing over the pin site had dried greenish material.  It was also malodorous. The pin sites themselves actually looked clean with minimal erythema and her swelling is improved. Today her pin sites were cleaned up and clean dressings applied. To facilitate daily dressing changes she was placed in a hinged elbow brace locking her in about 90 degrees of flexion. Instructions were provided for the nursing facility to change her dressings daily leaving her brace on at all times taking it off only for dressing change. I will see her back for reevaluation in a week and anticipate taking her pins out at that time due to her ongoing pin site infection. She was placed on another week of Keflex and I left instructions with the nursing facility to clean the pin sites daily with Clorpactin.

## 2020-07-16 NOTE — TELEPHONE ENCOUNTER
Nurse from [facility] wants to know if she has to use clorpactin that is mentioned in you last order to clean the patient's pins or if there is something else that can be used. She said that they do not have any. For the time being they are using soap and water to clean the pins and surrounding skin.      Nurse is Mynor Luevano and her direct line is [phone] ext [phone]

## 2020-07-23 NOTE — TELEPHONE ENCOUNTER
Nurse from [facility] called with concerns regarding the swelling in the patient's arm. I told her that the patient was swollen at her appt earlier this week when you saw her. I suggested that they wrap her arm in a compression wrap to see if that helps. Is there anything else that you recommend or want to do?

## 2020-07-24 NOTE — TELEPHONE ENCOUNTER
Order STAT CBC, ESR, and CRP. Also can schedule to come in to be put in a splint to rest her arm, but that allows for daily pin site wound care.

## 2020-07-25 NOTE — PROGRESS NOTES
HPI: Ms. Geoff Yang is a 80year-old who is now approximately 3 weeks status post closed reduction and pinning of her unstable left elbow dislocation. Following her last visit she was moved from a splint to a hinged elbow brace to allow for wound care. Physical examination:  Evaluation of patient's left upper extremity demonstrates her pin sites being clean however there is an increase in swelling diffusely involving this arm. No warmth or erythema noted. She has a 2+ radial pulse. Imaging studies: 3 x-ray views of the left elbow completed on 7/21/2020 demonstrates pins to be removed and there appears to be a fracture of the olecranon where the pin sites were. When compared to previous x-rays it appeared there was a radiolucency involving this area with the pins in place. Impression and plan: Ms. Geoff Yang is a 80year-old 3 weeks status post close reduction and percutaneous pinning of an unstable elbow dislocation. As noted radiographically while she does not appear to be dislocated at this time there appears to be a fracture through the olecranon where her pins were. I had a discussion with the patient's nephew who is her power of  showing him her x-rays. The fracture may be the result of stress sites created with the pins in place. There is also always a concern of the possibility of osteomyelitis stemming from a pin site infection. I had a discussion with the patient's nephew regarding treatment options moving forward given her age and activity level as well as her severe dementia I recommend proceeding with symptomatic management. Consequently I did place her on a two-week course of Keflex and will continue to monitor things. She was placed back in her hinged elbow brace keeping her locked in about 90 degrees of flexion. I will see her back for reevaluation in 4 weeks but they were encouraged to return or call earlier with questions and or concerns.

## 2020-08-20 NOTE — TELEPHONE ENCOUNTER
I called the guardian back Melania Eisenberg) and let him know your response. He stated the nurse at [facility] called him today and stated when they changed her dressing today on the elbow, the incision is healing up very nicely, but her elbow is rounded, doesn't look like a normal elbow and has some pitting to it and patient makes movements and sounds when they move that elbow, she is not talking at this time. He also stated the nursing home is using twice the amount of dressing on her, so her skin doesn't break down due to the brace. He wants to know if there is anything different they should be doing with her elbow at this time.

## 2020-09-02 NOTE — TELEPHONE ENCOUNTER
At this point, brace if for comfort. If it's bothersome to her I would discontinue and try and sling instead or nothing if she is more comfortable without.  I could also always see her here in clinic if he would prefer

## 2020-09-03 NOTE — TELEPHONE ENCOUNTER
I called and LVM for Justyna Pepe (son) on Dr. Ta Sheppard response. Called [facility] as well and spoke with Alejandro Hinojosa and gave her all the information. Patient is still not doing very good, she is still in Hosp[facility].

## 2020-09-18 ENCOUNTER — TELEPHONE (OUTPATIENT)
Dept: INTERNAL MEDICINE CLINIC | Age: 85
End: 2020-09-18

## 2020-09-18 NOTE — TELEPHONE ENCOUNTER
[facility] at [facility]. Would you sign death certificate?

## (undated) DEVICE — SOLUTION SCRB 4OZ 10% POVIDONE IOD ANTIMIC BTL

## (undated) DEVICE — BANDAGE COBAN 4 IN COMPR W4INXL5YD FOAM COHESIVE QUIK STK SELF ADH SFT

## (undated) DEVICE — GLOVE ORANGE PI 8 1/2   MSG9085

## (undated) DEVICE — GLOVE ORTHO 8   MSG9480

## (undated) DEVICE — GLOVE ORANGE PI 7 1/2   MSG9075

## (undated) DEVICE — STOCKINETTE,IMPERVIOUS,12X48,STERILE: Brand: MEDLINE

## (undated) DEVICE — GLOVE ORANGE PI 8   MSG9080

## (undated) DEVICE — GLOVE ORANGE PI 7   MSG9070

## (undated) DEVICE — BNDG,ELSTC,MATRIX,STRL,4"X5YD,LF,HOOK&LP: Brand: MEDLINE

## (undated) DEVICE — SPLINT CAST W5XL30IN GRN STRENGTH PLSTR OF PARIS FAST SET

## (undated) DEVICE — PADDING,UNDERCAST,COTTON, 4"X4YD STERILE: Brand: MEDLINE

## (undated) DEVICE — SOLUTION PREP POVIDONE IOD FOR SKIN MUCOUS MEM PRIOR TO

## (undated) DEVICE — GLOVE SURG SZ 85 L12IN FNGR ORTHO 126MIL CRM LTX FREE

## (undated) DEVICE — DRESSING,GAUZE,XEROFORM,CURAD,1"X8",ST: Brand: CURAD

## (undated) DEVICE — DRAPE C ARM UNIV W41XL74IN CLR PLAS XR VELC CLSR POLY STRP

## (undated) DEVICE — 3M™ IOBAN™ 2 ANTIMICROBIAL INCISE DRAPE 6650EZ: Brand: IOBAN™ 2

## (undated) DEVICE — DRAPE,REIN 53X77,STERILE: Brand: MEDLINE

## (undated) DEVICE — SOLUTION IV IRRIG POUR BRL 0.9% SODIUM CHL 2F7124

## (undated) DEVICE — GOWN,SIRUS,POLYRNF,BRTHSLV,XL,30/CS: Brand: MEDLINE

## (undated) DEVICE — GOWN,AURORA,NONRNF,XL,30/CS: Brand: MEDLINE

## (undated) DEVICE — Z DISCONTINUED BY MEDLINE USE 2711682 TRAY SKIN PREP DRY W/ PREM GLV

## (undated) DEVICE — WIRE FIX L304.8MM DIA1MM S STL BEAD ISOLA

## (undated) DEVICE — ORTHO EXT PK

## (undated) DEVICE — SINGLE USE DEVICE INTENDED TO COVER EXPOSED ENDS OF ORTHOPEDIC PIN AND K-WIRES TO HELP PROTECT THE EXPOSED WIRE FROM SNAGGING ON CLOTHING.: Brand: OXBORO™ PIN COVER